# Patient Record
Sex: MALE | Race: WHITE | NOT HISPANIC OR LATINO | Employment: FULL TIME | URBAN - METROPOLITAN AREA
[De-identification: names, ages, dates, MRNs, and addresses within clinical notes are randomized per-mention and may not be internally consistent; named-entity substitution may affect disease eponyms.]

---

## 2018-09-28 ENCOUNTER — HOSPITAL ENCOUNTER (INPATIENT)
Facility: OTHER | Age: 23
LOS: 1 days | Discharge: HOME | DRG: 176 | End: 2018-09-29
Attending: EMERGENCY MEDICINE | Admitting: HOSPITALIST
Payer: COMMERCIAL

## 2018-09-28 DIAGNOSIS — Z82.49 FAMILY HISTORY OF PULMONARY EMBOLISM: ICD-10-CM

## 2018-09-28 DIAGNOSIS — Z71.6 ENCOUNTER FOR SMOKING CESSATION COUNSELING: ICD-10-CM

## 2018-09-28 DIAGNOSIS — I26.99 OTHER ACUTE PULMONARY EMBOLISM WITHOUT ACUTE COR PULMONALE: ICD-10-CM

## 2018-09-28 DIAGNOSIS — I26.99 PULMONARY INFARCT: Primary | ICD-10-CM

## 2018-09-28 DIAGNOSIS — R07.9 RIGHT-SIDED CHEST PAIN: ICD-10-CM

## 2018-09-28 DIAGNOSIS — R00.1 BRADYCARDIA: ICD-10-CM

## 2018-09-28 DIAGNOSIS — I26.99 PULMONARY EMBOLISM: ICD-10-CM

## 2018-09-28 DIAGNOSIS — I26.99 PULMONARY THROMBOEMBOLISM: ICD-10-CM

## 2018-09-28 LAB
ANION GAP SERPL CALC-SCNC: 9 MMOL/L
BASOPHILS # BLD AUTO: 0.02 K/UL
BASOPHILS NFR BLD: 0.2 %
BUN SERPL-MCNC: 15 MG/DL
CALCIUM SERPL-MCNC: 9.5 MG/DL
CHLORIDE SERPL-SCNC: 109 MMOL/L
CO2 SERPL-SCNC: 26 MMOL/L
CREAT SERPL-MCNC: 0.8 MG/DL
D DIMER PPP IA.FEU-MCNC: 1.38 MG/L FEU
DIFFERENTIAL METHOD: ABNORMAL
EOSINOPHIL # BLD AUTO: 0.2 K/UL
EOSINOPHIL NFR BLD: 1.5 %
ERYTHROCYTE [DISTWIDTH] IN BLOOD BY AUTOMATED COUNT: 12.4 %
EST. GFR  (AFRICAN AMERICAN): >60 ML/MIN/1.73 M^2
EST. GFR  (NON AFRICAN AMERICAN): >60 ML/MIN/1.73 M^2
GLUCOSE SERPL-MCNC: 94 MG/DL
HCT VFR BLD AUTO: 41.8 %
HGB BLD-MCNC: 14.3 G/DL
LYMPHOCYTES # BLD AUTO: 1.4 K/UL
LYMPHOCYTES NFR BLD: 13.7 %
MCH RBC QN AUTO: 31.4 PG
MCHC RBC AUTO-ENTMCNC: 34.2 G/DL
MCV RBC AUTO: 92 FL
MONOCYTES # BLD AUTO: 0.9 K/UL
MONOCYTES NFR BLD: 9.1 %
NEUTROPHILS # BLD AUTO: 7.6 K/UL
NEUTROPHILS NFR BLD: 75.2 %
PLATELET # BLD AUTO: 151 K/UL
PMV BLD AUTO: 10.2 FL
POTASSIUM SERPL-SCNC: 3.7 MMOL/L
RBC # BLD AUTO: 4.55 M/UL
SODIUM SERPL-SCNC: 144 MMOL/L
WBC # BLD AUTO: 10.13 K/UL

## 2018-09-28 PROCEDURE — 63600175 PHARM REV CODE 636 W HCPCS: Performed by: EMERGENCY MEDICINE

## 2018-09-28 PROCEDURE — 85025 COMPLETE CBC W/AUTO DIFF WBC: CPT

## 2018-09-28 PROCEDURE — 80048 BASIC METABOLIC PNL TOTAL CA: CPT

## 2018-09-28 PROCEDURE — 99285 EMERGENCY DEPT VISIT HI MDM: CPT | Mod: 25

## 2018-09-28 PROCEDURE — 96374 THER/PROPH/DIAG INJ IV PUSH: CPT

## 2018-09-28 PROCEDURE — 93010 ELECTROCARDIOGRAM REPORT: CPT | Mod: ,,, | Performed by: INTERNAL MEDICINE

## 2018-09-28 PROCEDURE — 99220 PR INITIAL OBSERVATION CARE,LEVL III: CPT | Mod: ,,, | Performed by: NURSE PRACTITIONER

## 2018-09-28 PROCEDURE — 94761 N-INVAS EAR/PLS OXIMETRY MLT: CPT

## 2018-09-28 PROCEDURE — 93005 ELECTROCARDIOGRAM TRACING: CPT

## 2018-09-28 PROCEDURE — 25000003 PHARM REV CODE 250: Performed by: NURSE PRACTITIONER

## 2018-09-28 PROCEDURE — 25000003 PHARM REV CODE 250: Performed by: EMERGENCY MEDICINE

## 2018-09-28 PROCEDURE — 85379 FIBRIN DEGRADATION QUANT: CPT

## 2018-09-28 PROCEDURE — 96372 THER/PROPH/DIAG INJ SC/IM: CPT | Mod: 59

## 2018-09-28 PROCEDURE — 25500020 PHARM REV CODE 255: Performed by: EMERGENCY MEDICINE

## 2018-09-28 PROCEDURE — 11000001 HC ACUTE MED/SURG PRIVATE ROOM

## 2018-09-28 RX ORDER — KETOROLAC TROMETHAMINE 30 MG/ML
15 INJECTION, SOLUTION INTRAMUSCULAR; INTRAVENOUS
Status: COMPLETED | OUTPATIENT
Start: 2018-09-28 | End: 2018-09-28

## 2018-09-28 RX ORDER — SODIUM CHLORIDE 0.9 % (FLUSH) 0.9 %
3 SYRINGE (ML) INJECTION
Status: DISCONTINUED | OUTPATIENT
Start: 2018-09-28 | End: 2018-09-29 | Stop reason: HOSPADM

## 2018-09-28 RX ORDER — ACETAMINOPHEN 325 MG/1
650 TABLET ORAL EVERY 8 HOURS PRN
Status: DISCONTINUED | OUTPATIENT
Start: 2018-09-28 | End: 2018-09-29 | Stop reason: HOSPADM

## 2018-09-28 RX ORDER — ORPHENADRINE CITRATE 30 MG/ML
60 INJECTION INTRAMUSCULAR; INTRAVENOUS
Status: COMPLETED | OUTPATIENT
Start: 2018-09-28 | End: 2018-09-28

## 2018-09-28 RX ORDER — ENOXAPARIN SODIUM 100 MG/ML
1 INJECTION SUBCUTANEOUS
Status: COMPLETED | OUTPATIENT
Start: 2018-09-28 | End: 2018-09-28

## 2018-09-28 RX ORDER — ENOXAPARIN SODIUM 100 MG/ML
1 INJECTION SUBCUTANEOUS
Status: DISCONTINUED | OUTPATIENT
Start: 2018-09-28 | End: 2018-09-29 | Stop reason: HOSPADM

## 2018-09-28 RX ORDER — ALPRAZOLAM 0.25 MG/1
0.25 TABLET ORAL 2 TIMES DAILY PRN
Status: DISCONTINUED | OUTPATIENT
Start: 2018-09-28 | End: 2018-09-29 | Stop reason: HOSPADM

## 2018-09-28 RX ORDER — MULTIVITAMIN
1 TABLET ORAL DAILY
Status: ON HOLD | COMMUNITY
End: 2018-10-02 | Stop reason: CLARIF

## 2018-09-28 RX ADMIN — ENOXAPARIN SODIUM 70 MG: 100 INJECTION SUBCUTANEOUS at 07:09

## 2018-09-28 RX ADMIN — KETOROLAC TROMETHAMINE 15 MG: 30 INJECTION, SOLUTION INTRAMUSCULAR at 06:09

## 2018-09-28 RX ADMIN — ENOXAPARIN SODIUM 70 MG: 100 INJECTION SUBCUTANEOUS at 08:09

## 2018-09-28 RX ADMIN — ORPHENADRINE CITRATE 60 MG: 30 INJECTION INTRAMUSCULAR; INTRAVENOUS at 06:09

## 2018-09-28 RX ADMIN — ALPRAZOLAM 0.25 MG: 0.25 TABLET ORAL at 07:09

## 2018-09-28 RX ADMIN — IOHEXOL 75 ML: 350 INJECTION, SOLUTION INTRAVENOUS at 07:09

## 2018-09-28 RX ADMIN — ACETAMINOPHEN 650 MG: 325 TABLET ORAL at 02:09

## 2018-09-28 NOTE — NURSING TRANSFER
Nursing Transfer Note      9/28/2018     Transfer From: From East to room 303    Transfer via bed patient bed    Transfer with cardiac monitoring belongings    Transported by transport      Notified: Mother    Patient reassessed at:  9/28/2018 17:30    Upon arrival to floor: Patient awake, alert and oriented x 3.  Respirations even and unlabored. No complaints voiced at this time, mother at bedside.

## 2018-09-28 NOTE — ASSESSMENT & PLAN NOTE
Awaiting information from patient's mother; will follow up with HemOnc on discharge.  Will need to establish PCP at [facility] on discharge.

## 2018-09-28 NOTE — H&P
"[facility] Hospital Medicine  History & Physical    Patient Name: Alcides Schaefer  MRN: [id number]  Admission Date: 9/28/2018  Attending Physician: Sherron Clifford MD   Primary Care Provider: No primary care provider on file.         Patient information was obtained from patient and ER records.     Subjective:     Principal Problem:Pulmonary thromboembolism    Chief Complaint:   Chief Complaint   Patient presents with    Shoulder Pain     woke up with right shoulder pain, thought he slept on it wrong, but conitnued to hurt, getting worse; now in right lateral chest        HPI: Mr. Alcides Schaefer is a 23 year old male without pertinent past medical history presented to the Emergency Department with complaint of right shoulder pain and right chest wall pain that began at approximately 0530 this morning. The patient states he awoke this morning and initially noted pain to his right shoulder.  He states the pain radiated to right sided chest wall, but assumed it was because he slept on his side the "wrong way."  He states after taking his morning shower he developed a sudden episode of shortness of breath and pain on deep inspiration.  He states he was concerned because his sister had a pulmonary embolism and recalled her symptoms were the same.   He denies any recent trauma to the area or heavy lifting.  He reports coughing over the last few days which has resolved and he denies any hemoptysis  The patient reports that he moved to [place] from [place] approximately three weeks ago.  Two weeks ago, he reports air travel from [place] to [place] and during trip he recalls pain to bilateral calves, but assumed it was due to long periods of walking.  One week ago, he also reports a seven hour car ride from his home in [place] to [place] after returning from [place].  He currently denies leg pain. His risk factors include smoking and  family history of pulmonary emboli, his sister at age " "16 and maternal aunt.  He states his sister did have testing, but the tests were "inconclusive."  CTA of chest in the ED Bilateral pulmonary thromboemboli involving segmental and subsegmental branches of the bilateral upper and lower lobes  concerning for developing right lower lobe pulmonary infarction. The patient was started in lovenox 1m/kg in the ED.  He will be admitted under observation for close monitoring and continuation of anticoagulation.            History reviewed. No pertinent past medical history.    Past Surgical History:   Procedure Laterality Date    NO PAST SURGERIES         Review of patient's allergies indicates:  No Known Allergies    No current facility-administered medications on file prior to encounter.      No current outpatient medications on file prior to encounter.     Family History     Problem Relation (Age of Onset)    Pulmonary embolism Sister, Paternal Aunt        Tobacco Use    Smoking status: Current Some Day Smoker     Types: Cigarettes    Smokeless tobacco: Never Used    Tobacco comment: 2-3 cigarettes daily    Substance and Sexual Activity    Alcohol use: Yes    Drug use: Yes     Frequency: 3.0 times per week     Types: Marijuana    Sexual activity: Not on file     Review of Systems   Constitutional: Negative for chills and fever.   HENT: Negative for congestion and trouble swallowing.    Eyes: Negative for visual disturbance.   Respiratory: Positive for chest tightness. Negative for shortness of breath.    Cardiovascular: Negative for chest pain, palpitations and leg swelling.   Gastrointestinal: Negative for abdominal pain, diarrhea, nausea and vomiting.   Genitourinary: Negative for dysuria and hematuria.   Musculoskeletal: Negative.    Skin: Negative.    Neurological: Negative for seizures and weakness.   Hematological: Does not bruise/bleed easily.   Psychiatric/Behavioral: Negative.      Objective:     Vital Signs (Most Recent):  Temp: 97.9 °F (36.6 °C) (09/28/18 " 0943)  Pulse: (!) 48 (09/28/18 0943)  Resp: 18 (09/28/18 0943)  BP: 108/61 (09/28/18 0943)  SpO2: 100 % (09/28/18 0943) Vital Signs (24h Range):  Temp:  [97.8 °F (36.6 °C)-97.9 °F (36.6 °C)] 97.9 °F (36.6 °C)  Pulse:  [44-52] 48  Resp:  [14-21] 18  SpO2:  [96 %-100 %] 100 %  BP: (108-123)/(55-74) 108/61     Weight: 65.8 kg (145 lb 0 oz)  Body mass index is 21.41 kg/m².    Physical Exam   Constitutional: He is oriented to person, place, and time. He appears well-developed and well-nourished. No distress.   HENT:   Head: Normocephalic and atraumatic.   Eyes: Conjunctivae and lids are normal. Pupils are equal, round, and reactive to light.   Neck: Normal range of motion. Neck supple.   Cardiovascular: Regular rhythm, normal heart sounds, intact distal pulses and normal pulses. Bradycardia present.   Pulmonary/Chest: Effort normal and breath sounds normal. No respiratory distress.   Abdominal: Soft. Normal appearance and bowel sounds are normal. There is no tenderness.   Musculoskeletal: He exhibits no edema or tenderness.   Lymphadenopathy:     He has no cervical adenopathy.   Neurological: He is alert and oriented to person, place, and time. He has normal strength.   Skin: Skin is warm, dry and intact.   Psychiatric: He has a normal mood and affect. His behavior is normal. Cognition and memory are normal.   Nursing note and vitals reviewed.        CRANIAL NERVES     CN III, IV, VI   Pupils are equal, round, and reactive to light.       Significant Labs:   CBC:   Recent Labs   Lab  09/28/18   0639   WBC  10.13   HGB  14.3   HCT  41.8   PLT  151     CMP:   Recent Labs   Lab  09/28/18   0639   NA  144   K  3.7   CL  109   CO2  26   GLU  94   BUN  15   CREATININE  0.8   CALCIUM  9.5   ANIONGAP  9   EGFRNONAA  >60       D Dimer 9/28/2018  1.38    All pertinent labs within the past 24 hours have been reviewed.    Significant Imaging: I have reviewed all pertinent imaging results/findings within the past 24 hours.     CTA  Chest Non-contrast 9/28/2018  Impression     Bilateral pulmonary thromboemboli involving segmental and subsegmental branches of the bilateral upper and lower lobes with findings concerning for developing right lower lobe pulmonary infarction.  No evidence of right heart strain.   Electronically signed by: Jocy Martinez MD  Date: 09/28/2018  Time: 08:03         Assessment/Plan:     * Pulmonary thromboembolism    D Dimer 1.38 and CTA Chest 9/28/2018: Bilateral pulmonary thromboemboli involving segmental and subsegmental branches of the bilateral upper and lower lobes with findings concerning for developing right lower lobe pulmonary infarction.  No evidence of right heart strain.    Place on telemetry  Will monitor for hypotension.  Continue lovenox 1mg/kg (70 mg) twice daily for now          Right-sided chest pain    Offer acetaminophen for pain          Family history of pulmonary embolism    Awaiting information from patient's mother; will follow up with HemOnc on discharge.  Will need to establish PCP at [facility] on discharge.          Encounter for smoking cessation counseling    States is willing to quit.  Will continue to provide support and education for smoking cessation.          Bradycardia    Asymptomatic; patient is a long time runner.  Will monitor.            VTE Risk Mitigation (From admission, onward)        Ordered     enoxaparin injection 70 mg  Every 12 hours (non-standard times)      09/28/18 0955     IP VTE HIGH RISK PATIENT  Once      09/28/18 0955             Judi Hollis NP  Department of Hospital Medicine   [facility]

## 2018-09-28 NOTE — ASSESSMENT & PLAN NOTE
States is willing to quit.  Will continue to provide support and education for smoking cessation.

## 2018-09-28 NOTE — PHARMACY MED REC
"Admission Medication Reconciliation - Pharmacy Consult Note    The home medication history was taken by Anitha Terry CPhT.  Based on information gathered and subsequent review by the clinical pharmacist, the items below may need attention.    You may go to "Admission" then "Reconcile Home Medications" tabs to review and/or act upon these items.    No issues noted with the medication reconciliation.    Medications Prior to Admission   Medication Sig Dispense Refill Last Dose    DM/pseudoephed/acetaminophen (VICKS DAYQUIL ORAL) Pt took a capful (5-10 ml) on yesterday 9/27/18       multivitamin (THERAGRAN) per tablet Take 1 tablet by mouth once daily.          Please address this information as you see fit.  Feel free to contact us if you have any questions or require assistance.    Emre Birmingham, Pharm.D., BCPS  [phone]                .  .          "

## 2018-09-28 NOTE — ED TRIAGE NOTES
Pt presents with right shoulder pain, onset this AM. Pt woke up with pain. Pt points to right trapezius muscle to locate pain. Pt denies any recent heavy lifting or trauma to the area. Pt states some recent travel on plane and long car rides. Pt denies any leg swelling. Pt denies taking any medication for symptoms PTA. Pt is well appearing, pt in NAD.

## 2018-09-28 NOTE — ED PROVIDER NOTES
Encounter Date: 9/28/2018       History     Chief Complaint   Patient presents with    Shoulder Pain     woke up with right shoulder pain, thought he slept on it wrong, but conitnued to hurt, getting worse; now in right lateral chest     23-year-old healthy male presents complaining of approximately an hour and 45 min of right shoulder pain that has now progressed to right-sided chest wall pain down to the edge of the rib.  The pain is now aggravated by deep inspiration.  Patient reports waking up with this pain.  He denies any interventions at home prior to his ED visit.  He denies any similar symptoms in the past.  He denies any recent trauma to the area or heavy lifting.  He reports coughing over the last few days which has resolved.  He denies any hemoptysis or leg swelling. He does report recent travel which includes a 7 hr car ride from [place] approximately 1 week ago.  He also reports a family history of pulmonary emboli which is unclear if they have been provoked.  He does admit to smoking.          Review of patient's allergies indicates:  No Known Allergies  History reviewed. No pertinent past medical history.  Past Surgical History:   Procedure Laterality Date    NO PAST SURGERIES       History reviewed. No pertinent family history.  Social History     Tobacco Use    Smoking status: Current Every Day Smoker    Smokeless tobacco: Never Used    Tobacco comment: 2-3 cigarettes daily    Substance Use Topics    Alcohol use: Yes    Drug use: Yes     Frequency: 3.0 times per week     Types: Marijuana     Review of Systems   Constitutional: Negative for chills, diaphoresis, fatigue and fever.   Respiratory: Negative for cough, chest tightness, wheezing and stridor.    Cardiovascular: Positive for chest pain. Negative for palpitations and leg swelling.   Gastrointestinal: Negative for abdominal pain, diarrhea, nausea and vomiting.   Genitourinary: Negative for dysuria, flank pain, frequency and  hematuria.   Musculoskeletal: Negative for arthralgias, myalgias and neck pain.   Skin: Negative for rash.   Neurological: Negative for dizziness, weakness, light-headedness and headaches.       Physical Exam     Initial Vitals [09/28/18 0609]   BP Pulse Resp Temp SpO2   123/74 (!) 51 18 97.8 °F (36.6 °C) 99 %      MAP       --         Physical Exam    Nursing note and vitals reviewed.  Constitutional: He appears well-developed and well-nourished. He is not diaphoretic. No distress.   HENT:   Head: Normocephalic and atraumatic.   Right Ear: External ear normal.   Left Ear: External ear normal.   Nose: Nose normal.   Eyes: Conjunctivae and EOM are normal. Right eye exhibits no discharge. Left eye exhibits no discharge. No scleral icterus.   Neck: Normal range of motion. Neck supple. No tracheal deviation present. No JVD present.   Cardiovascular: Normal rate, regular rhythm and normal heart sounds. Exam reveals no friction rub.    No murmur heard.  Pulmonary/Chest: Breath sounds normal. No stridor. No respiratory distress. He has no wheezes. He has no rhonchi. He has no rales.   Right-sided anterior chest wall tenderness to palpation with no crepitus   Musculoskeletal: Normal range of motion. He exhibits no edema or tenderness.   Pain is not reproducible with arm movement against resistance, no calf tenderness, no leg edema   Neurological: He is alert and oriented to person, place, and time. He has normal strength.   Skin: Skin is warm and dry. Capillary refill takes less than 2 seconds. No rash noted. No erythema.   No rash or erythema present over chest wall   Psychiatric: He has a normal mood and affect. His behavior is normal. Judgment and thought content normal.         ED Course   Procedures  Labs Reviewed   CBC W/ AUTO DIFFERENTIAL   BASIC METABOLIC PANEL   D DIMER, QUANTITATIVE     EKG Readings: (Independently Interpreted)   Initial Reading: No STEMI. Rhythm: Sinus Bradycardia. Heart Rate: 49. Ectopy: No  Ectopy. Conduction: Normal.       Imaging Results          X-Ray Chest PA And Lateral (Final result)  Result time 09/28/18 06:29:01    Final result by Jocy Martinez MD (09/28/18 06:29:01)                 Impression:      No radiographic evidence of acute intra-thoracic process.      Electronically signed by: Jocy Martinez MD  Date:    09/28/2018  Time:    06:29             Narrative:    EXAMINATION:  XR CHEST PA AND LATERAL    CLINICAL HISTORY:  Chest pain, unspecified.    TECHNIQUE:  PA and lateral views of the chest were performed.    COMPARISON:  None    FINDINGS:  The cardiomediastinal silhouette is within normal limits. The visualized airway is unremarkable.  The lungs appear symmetrically aerated without definite focal alveolar consolidation. No large pleural effusion or pneumothorax is appreciated.Visualized osseous structures are unremarkable.                                    Additional MDM:   Comments: 23-year-old male presents complaining of right-sided shoulder/chest pain which is aggravated by inspiration.  Patient is in no respiratory distress but appears uncomfortable.  Initial vital signs within normal limits. Exam significant for right-sided chest wall tenderness to palpation. Concern for a pneumothorax versus pulmonary embolus versus musculoskeletal etiology.  Will obtain a chest x-ray and if negative will proceed with lab work including a D-dimer.     6:32 AM  EKG is consistent sinus bradycardia but otherwise unremarkable.  Chest x-ray shows no evidence of a pneumothorax.  Will obtain labs including CBC, BMP, and D-dimer as well as given Toradol and Norflex.      6:50 AM  Patient is still awaiting medications.  Labs are pending.  His care will be transferred to the 7:00 a.m. physician for reassessment and final disposition..                    Clinical Impression:     1. Right-sided chest pain    2. Right-sided chest pain                                 Anna Youngblood MD  09/28/18 0662

## 2018-09-28 NOTE — ASSESSMENT & PLAN NOTE
D Dimer 1.38 and CTA Chest 9/28/2018: Bilateral pulmonary thromboemboli involving segmental and subsegmental branches of the bilateral upper and lower lobes with findings concerning for developing right lower lobe pulmonary infarction.  No evidence of right heart strain.    Place on telemetry  Will monitor for hypotension.  Continue lovenox 1mg/kg (70 mg) twice daily for now

## 2018-09-28 NOTE — ED NOTES
NEURO: Pt AAO x 4. Behavior and speech appropriate to situation.   CARDIAC: Regular rhythm auscultated  RESPIRATORY: Respirations even and unlabored. Breath sounds clear and equal to all lung fields.   MUSCULOSKELETAL: Active ROM noted to extremities. Tenderness to palpation to right trapezius, right superior lateral chest wall

## 2018-09-28 NOTE — SUBJECTIVE & OBJECTIVE
History reviewed. No pertinent past medical history.    Past Surgical History:   Procedure Laterality Date    NO PAST SURGERIES         Review of patient's allergies indicates:  No Known Allergies    No current facility-administered medications on file prior to encounter.      No current outpatient medications on file prior to encounter.     Family History     Problem Relation (Age of Onset)    Pulmonary embolism Sister, Paternal Aunt        Tobacco Use    Smoking status: Current Some Day Smoker     Types: Cigarettes    Smokeless tobacco: Never Used    Tobacco comment: 2-3 cigarettes daily    Substance and Sexual Activity    Alcohol use: Yes    Drug use: Yes     Frequency: 3.0 times per week     Types: Marijuana    Sexual activity: Not on file     Review of Systems   Constitutional: Negative for chills and fever.   HENT: Negative for congestion and trouble swallowing.    Eyes: Negative for visual disturbance.   Respiratory: Positive for chest tightness. Negative for shortness of breath.    Cardiovascular: Negative for chest pain, palpitations and leg swelling.   Gastrointestinal: Negative for abdominal pain, diarrhea, nausea and vomiting.   Genitourinary: Negative for dysuria and hematuria.   Musculoskeletal: Negative.    Skin: Negative.    Neurological: Negative for seizures and weakness.   Hematological: Does not bruise/bleed easily.   Psychiatric/Behavioral: Negative.      Objective:     Vital Signs (Most Recent):  Temp: 97.9 °F (36.6 °C) (09/28/18 0943)  Pulse: (!) 48 (09/28/18 0943)  Resp: 18 (09/28/18 0943)  BP: 108/61 (09/28/18 0943)  SpO2: 100 % (09/28/18 0943) Vital Signs (24h Range):  Temp:  [97.8 °F (36.6 °C)-97.9 °F (36.6 °C)] 97.9 °F (36.6 °C)  Pulse:  [44-52] 48  Resp:  [14-21] 18  SpO2:  [96 %-100 %] 100 %  BP: (108-123)/(55-74) 108/61     Weight: 65.8 kg (145 lb 0 oz)  Body mass index is 21.41 kg/m².    Physical Exam   Constitutional: He is oriented to person, place, and time. He appears  well-developed and well-nourished. No distress.   HENT:   Head: Normocephalic and atraumatic.   Eyes: Conjunctivae and lids are normal. Pupils are equal, round, and reactive to light.   Neck: Normal range of motion. Neck supple.   Cardiovascular: Regular rhythm, normal heart sounds, intact distal pulses and normal pulses. Bradycardia present.   Pulmonary/Chest: Effort normal and breath sounds normal. No respiratory distress.   Abdominal: Soft. Normal appearance and bowel sounds are normal. There is no tenderness.   Musculoskeletal: He exhibits no edema or tenderness.   Lymphadenopathy:     He has no cervical adenopathy.   Neurological: He is alert and oriented to person, place, and time. He has normal strength.   Skin: Skin is warm, dry and intact.   Psychiatric: He has a normal mood and affect. His behavior is normal. Cognition and memory are normal.   Nursing note and vitals reviewed.        CRANIAL NERVES     CN III, IV, VI   Pupils are equal, round, and reactive to light.       Significant Labs:   CBC:   Recent Labs   Lab  09/28/18   0639   WBC  10.13   HGB  14.3   HCT  41.8   PLT  151     CMP:   Recent Labs   Lab  09/28/18   0639   NA  144   K  3.7   CL  109   CO2  26   GLU  94   BUN  15   CREATININE  0.8   CALCIUM  9.5   ANIONGAP  9   EGFRNONAA  >60       D Dimer 9/28/2018  1.38    All pertinent labs within the past 24 hours have been reviewed.    Significant Imaging: I have reviewed all pertinent imaging results/findings within the past 24 hours.     CTA Chest Non-contrast 9/28/2018  Impression     Bilateral pulmonary thromboemboli involving segmental and subsegmental branches of the bilateral upper and lower lobes with findings concerning for developing right lower lobe pulmonary infarction.  No evidence of right heart strain.   Electronically signed by: Jocy Martinez MD  Date: 09/28/2018  Time: 08:03

## 2018-09-28 NOTE — HPI
"Mr. Alcides Schaefer is a 23 year old male without pertinent past medical history presented to the Emergency Department with complaint of right shoulder pain and right chest wall pain that began at approximately 0530 this morning. The patient states he awoke this morning and initially noted pain to his right shoulder.  He states the pain radiated to right sided chest wall, but assumed it was because he slept on his side the "wrong way."  He states after taking his morning shower he developed a sudden episode of shortness of breath and pain on deep inspiration.  He states he was concerned because his sister had a pulmonary embolism and recalled her symptoms were the same.   He denies any recent trauma to the area or heavy lifting.  He reports coughing over the last few days which has resolved and he denies any hemoptysis  The patient reports that he moved to [place] from [place] approximately three weeks ago.  Two weeks ago, he reports air travel from [place] to [place] and during trip he recalls pain to bilateral calves, but assumed it was due to long periods of walking.  One week ago, he also reports a seven hour car ride from his home in [place] to [place] after returning from [place].  He currently denies leg pain. His risk factors include smoking and  family history of pulmonary emboli, his sister at age 16 and maternal aunt.  He states his sister did have testing, but the tests were "inconclusive."  CTA of chest in the ED Bilateral pulmonary thromboemboli involving segmental and subsegmental branches of the bilateral upper and lower lobes  concerning for developing right lower lobe pulmonary infarction. The patient was started in lovenox 1m/kg in the ED.  He will be admitted under observation for close monitoring and continuation of anticoagulation.          "

## 2018-09-29 VITALS
BODY MASS INDEX: 21.48 KG/M2 | OXYGEN SATURATION: 98 % | DIASTOLIC BLOOD PRESSURE: 55 MMHG | TEMPERATURE: 98 F | SYSTOLIC BLOOD PRESSURE: 106 MMHG | RESPIRATION RATE: 18 BRPM | HEIGHT: 69 IN | HEART RATE: 54 BPM | WEIGHT: 145 LBS

## 2018-09-29 LAB
ANION GAP SERPL CALC-SCNC: 9 MMOL/L
BASOPHILS # BLD AUTO: 0.01 K/UL
BASOPHILS NFR BLD: 0.1 %
BUN SERPL-MCNC: 11 MG/DL
CALCIUM SERPL-MCNC: 9.4 MG/DL
CHLORIDE SERPL-SCNC: 107 MMOL/L
CO2 SERPL-SCNC: 23 MMOL/L
CREAT SERPL-MCNC: 0.7 MG/DL
DIFFERENTIAL METHOD: ABNORMAL
EOSINOPHIL # BLD AUTO: 0.1 K/UL
EOSINOPHIL NFR BLD: 1.5 %
ERYTHROCYTE [DISTWIDTH] IN BLOOD BY AUTOMATED COUNT: 12.5 %
EST. GFR  (AFRICAN AMERICAN): >60 ML/MIN/1.73 M^2
EST. GFR  (NON AFRICAN AMERICAN): >60 ML/MIN/1.73 M^2
GLUCOSE SERPL-MCNC: 90 MG/DL
HCT VFR BLD AUTO: 38.6 %
HGB BLD-MCNC: 13.1 G/DL
LYMPHOCYTES # BLD AUTO: 1.9 K/UL
LYMPHOCYTES NFR BLD: 19.8 %
MCH RBC QN AUTO: 31 PG
MCHC RBC AUTO-ENTMCNC: 33.9 G/DL
MCV RBC AUTO: 92 FL
MONOCYTES # BLD AUTO: 1.2 K/UL
MONOCYTES NFR BLD: 12.6 %
NEUTROPHILS # BLD AUTO: 6.3 K/UL
NEUTROPHILS NFR BLD: 65.8 %
PLATELET # BLD AUTO: 137 K/UL
PMV BLD AUTO: 10.1 FL
POTASSIUM SERPL-SCNC: 3.8 MMOL/L
RBC # BLD AUTO: 4.22 M/UL
SODIUM SERPL-SCNC: 139 MMOL/L
WBC # BLD AUTO: 9.63 K/UL

## 2018-09-29 PROCEDURE — 85025 COMPLETE CBC W/AUTO DIFF WBC: CPT

## 2018-09-29 PROCEDURE — 36415 COLL VENOUS BLD VENIPUNCTURE: CPT

## 2018-09-29 PROCEDURE — 63600175 PHARM REV CODE 636 W HCPCS: Performed by: EMERGENCY MEDICINE

## 2018-09-29 PROCEDURE — 99239 HOSP IP/OBS DSCHRG MGMT >30: CPT | Mod: ,,, | Performed by: NURSE PRACTITIONER

## 2018-09-29 PROCEDURE — 80048 BASIC METABOLIC PNL TOTAL CA: CPT

## 2018-09-29 PROCEDURE — 94761 N-INVAS EAR/PLS OXIMETRY MLT: CPT

## 2018-09-29 RX ORDER — HYDROCODONE BITARTRATE AND ACETAMINOPHEN 5; 325 MG/1; MG/1
1 TABLET ORAL EVERY 8 HOURS PRN
Qty: 12 TABLET | Refills: 0 | Status: SHIPPED | OUTPATIENT
Start: 2018-09-29

## 2018-09-29 RX ADMIN — ENOXAPARIN SODIUM 70 MG: 100 INJECTION SUBCUTANEOUS at 09:09

## 2018-09-29 NOTE — PLAN OF CARE
SW met with pt at bedside to complete discharge assessment, no PCP and uses [facility] on [address] and [address].  Pt lives with 2 friends; mother present and will provide transportation home.  No need identified at this time.     09/29/18 1103   Discharge Assessment   Assessment Type Discharge Planning Assessment   Confirmed/corrected address and phone number on facesheet? Yes   Assessment information obtained from? Patient   Communicated expected length of stay with patient/caregiver no   Prior to hospitilization cognitive status: Alert/Oriented   Prior to hospitalization functional status: Independent   Current cognitive status: Alert/Oriented   Current Functional Status: Independent   Lives With friend(s)   Able to Return to Prior Arrangements yes   Is patient able to care for self after discharge? Yes;Unable to determine at this time (comments)   Patient's perception of discharge disposition home or selfcare   Readmission Within The Last 30 Days no previous admission in last 30 days   Patient currently being followed by outpatient case management? No   Patient currently receives any other outside agency services? No   Equipment Currently Used at Home none   Do you have any problems affording any of your prescribed medications? No   Is the patient taking medications as prescribed? yes   Does the patient have transportation home? Yes   Transportation Available family or friend will provide   Does the patient receive services at the [facility]? No   Discharge Plan A Home   Patient/Family In Agreement With Plan yes

## 2018-09-29 NOTE — NURSING
Patient educated on discharge instructions and verbalized understanding.  All questions answered to patient's satisfaction.  IV removed without complication.  Mother at bedside.  Patient to be transported off unit by walking.

## 2018-09-29 NOTE — PLAN OF CARE
Problem: Patient Care Overview  Goal: Plan of Care Review  Outcome: Ongoing (interventions implemented as appropriate)  Vital signs stable, on room air. Pt was anxious upon assessment. BETHANY Perdomo notified. Xanax ordered and relieved symptoms. Pt can ambulate and reposition self independently. On tele monitor, running sinus kasie. Bed locked, in lowest position. Call light, personal items, and family at bedside. Will continue to monitor.

## 2018-09-29 NOTE — PLAN OF CARE
09/29/18 1338   Final Note   Assessment Type Final Discharge Note   Discharge Disposition Home   What phone number can be called within the next 1-3 days to see how you are doing after discharge? ([phone])   Hospital Follow Up  Appt(s) scheduled? No   Discharge plans and expectations educations in teach back method with documentation complete? No

## 2018-09-29 NOTE — HOSPITAL COURSE
The patient is a 23 year old male with no significant medical history who was admitted under observation after being found to have pulmonary thromboemboli in bilateral upper and lower lobes with concern for developing right lower lobe infarct. D dimer was elevated 1.38 in the Emergency Department.   He has identified risk factors of pulmonary embolism in his sister and current cigarette/marijuana smoker. He was counseled on smoking cessation and declined nicotine patch during stay.  He is motivated to quit. The patient continued with lovenox 1mg/kg (70 mg) twice daily through observation period.  He continued to report pleuritic chest pain, but denied shortness of breath. He will be discharged with hydrocodone acetaminophen 7.5-325 every eight hours for pain.  Oxygen saturation greater that 97% during stay.  Patient was bradycardic during stay but this may be his baseline heart rate as he was asymptomatic.  He was discharged on rivaroxaban 15 mg twice daily for twenty one days.  He will follow with Hematology Oncology as soon as possible to establish care and for initiation of hypercoagulable workup given family history.   He will also follow up within one month with primary care to establish care and for follow up after hospital discharge.

## 2018-09-29 NOTE — DISCHARGE SUMMARY
"[facility] Hospital Medicine  Discharge Summary      Patient Name: Alcides Schaefer  MRN: [id number]  Admission Date: 9/28/2018  Hospital Length of Stay: 1 days  Discharge Date and Time: 9/29/2018  3:00 PM  Attending Physician: Sherron Clifford MD  Discharging Provider: Judi Hollis NP  Primary Care Provider: Primary Doctor No      HPI:   Mr. Alcides Schaefer is a 23 year old male without pertinent past medical history presented to the Emergency Department with complaint of right shoulder pain and right chest wall pain that began at approximately 0530 this morning. The patient states he awoke this morning and initially noted pain to his right shoulder.  He states the pain radiated to right sided chest wall, but assumed it was because he slept on his side the "wrong way."  He states after taking his morning shower he developed a sudden episode of shortness of breath and pain on deep inspiration.  He states he was concerned because his sister had a pulmonary embolism and recalled her symptoms were the same.   He denies any recent trauma to the area or heavy lifting.  He reports coughing over the last few days which has resolved and he denies any hemoptysis  The patient reports that he moved to [place] from [place] approximately three weeks ago.  Two weeks ago, he reports air travel from [place] to [place] and during trip he recalls pain to bilateral calves, but assumed it was due to long periods of walking.  One week ago, he also reports a seven hour car ride from his home in [place] to [place] after returning from [place].  He currently denies leg pain. His risk factors include smoking and  family history of pulmonary emboli, his sister at age 16 and maternal aunt.  He states his sister did have testing, but the tests were "inconclusive."  CTA of chest in the ED Bilateral pulmonary thromboemboli involving segmental and subsegmental branches of the bilateral upper and lower lobes  " concerning for developing right lower lobe pulmonary infarction. The patient was started in lovenox 1m/kg in the ED.  He will be admitted under observation for close monitoring and continuation of anticoagulation.            * No surgery found *      Hospital Course:   The patient is a 23 year old male with no significant medical history who was admitted under observation after being found to have pulmonary thromboemboli in bilateral upper and lower lobes with concern for developing right lower lobe infarct. D dimer was elevated 1.38 in the Emergency Department.   He has identified risk factors of pulmonary embolism in his sister and current cigarette/marijuana smoker. He was counseled on smoking cessation and declined nicotine patch during stay.  He is motivated to quit. The patient continued with lovenox 1mg/kg (70 mg) twice daily through observation period.  He continued to report pleuritic chest pain, but denied shortness of breath. He will be discharged with hydrocodone acetaminophen 7.5-325 every eight hours for pain.  Oxygen saturation greater that 97% during stay.  Patient was bradycardic during stay but this may be his baseline heart rate as he was asymptomatic.  He was discharged on rivaroxaban 15 mg twice daily for twenty one days.  He will follow with Hematology Oncology as soon as possible to establish care and for initiation of hypercoagulable workup given family history.   He will also follow up within one month with primary care to establish care and for follow up after hospital discharge.          Consults:     No new Assessment & Plan notes have been filed under this hospital service since the last note was generated.  Service: Hospital Medicine    Final Active Diagnoses:    Diagnosis Date Noted POA    PRINCIPAL PROBLEM:  Pulmonary thromboembolism [I26.99] 09/28/2018 Yes    Pulmonary infarct [I26.99] 09/28/2018 Yes    Right-sided chest pain [R07.9] 09/28/2018 Yes    Family history of pulmonary  embolism [Z82.49] 09/28/2018 Not Applicable    Encounter for smoking cessation counseling [Z71.6, Z72.0] 09/28/2018 Not Applicable    Bradycardia [R00.1] 09/28/2018 Yes      Problems Resolved During this Admission:       Discharged Condition: stable    Disposition: Home or Self Care    Follow Up:  Follow-up Information     Ignacia Kim NP In 2 days.    Specialty:  Hematology and Oncology  Why:  Follow up after hospital discharge and for further evaluation after pulmonary embolism  Contact information:  [address]  [facility] [id number] [phone]             Macey Brizuela MD In 1 week.    Specialty:  Internal Medicine  Why:  follow up after hospital discharge  Contact information:  [address]  [address]  [facility] [address] [phone]                 Patient Instructions:      Diet Adult Regular     Notify your health care provider if you experience any of the following:  increased confusion or weakness     Notify your health care provider if you experience any of the following:  persistent dizziness, light-headedness, or visual disturbances     Notify your health care provider if you experience any of the following:  difficulty breathing or increased cough     Notify your health care provider if you experience any of the following:  severe uncontrolled pain     Notify your health care provider if you experience any of the following:  temperature >100.4     Activity as tolerated       Significant Diagnostic Studies: Labs:   CMP   Recent Labs   Lab  09/28/18   0639  09/29/18   0541   NA  144  139   K  3.7  3.8   CL  109  107   CO2  26  23   GLU  94  90   BUN  15  11   CREATININE  0.8  0.7   CALCIUM  9.5  9.4   ANIONGAP  9  9   ESTGFRAFRICA  >60  >60   EGFRNONAA  >60  >60   , CBC   Recent Labs   Lab  09/28/18   0639  09/29/18   0541   WBC  10.13  9.63   HGB  14.3  13.1*   HCT  41.8  38.6*   PLT  151  137*     D Dimer 9/28/2018  1.38     All labs within the past 24 hours have been  reviewed    Pending Diagnostic Studies:     None         Medications:  Reconciled Home Medications:      Medication List      START taking these medications    HYDROcodone-acetaminophen 5-325 mg per tablet  Commonly known as:  NORCO  Take 1 tablet by mouth every 8 (eight) hours as needed for Pain (pleuritic pain).     rivaroxaban 15 mg Tab  Commonly known as:  XARELTO  Take 1 tablet (15 mg total) by mouth 2 (two) times daily with meals. Please take first dose between 6:00 pm and 8:00 pm on Saturday, September 29, 2018. for 21 days        CONTINUE taking these medications    multivitamin per tablet  Commonly known as:  THERAGRAN  Take 1 tablet by mouth once daily.        STOP taking these medications    VICKS DAYQUIL ORAL            Indwelling Lines/Drains at time of discharge:   Lines/Drains/Airways          None          Time spent on the discharge of patient: 30 minutes  Patient was seen and examined on the date of discharge and determined to be suitable for discharge.         Judi Hollis NP  Department of Hospital Medicine  [facility]

## 2018-10-01 ENCOUNTER — INITIAL CONSULT (OUTPATIENT)
Dept: HEMATOLOGY/ONCOLOGY | Facility: CLINIC | Age: 23
End: 2018-10-01
Payer: COMMERCIAL

## 2018-10-01 ENCOUNTER — LAB VISIT (OUTPATIENT)
Dept: LAB | Facility: HOSPITAL | Age: 23
End: 2018-10-01
Attending: INTERNAL MEDICINE
Payer: COMMERCIAL

## 2018-10-01 ENCOUNTER — TELEPHONE (OUTPATIENT)
Dept: HEMATOLOGY/ONCOLOGY | Facility: CLINIC | Age: 23
End: 2018-10-01

## 2018-10-01 VITALS
RESPIRATION RATE: 18 BRPM | HEART RATE: 63 BPM | OXYGEN SATURATION: 100 % | BODY MASS INDEX: 19.51 KG/M2 | TEMPERATURE: 99 F | HEIGHT: 70 IN | WEIGHT: 136.25 LBS | DIASTOLIC BLOOD PRESSURE: 62 MMHG | SYSTOLIC BLOOD PRESSURE: 112 MMHG

## 2018-10-01 DIAGNOSIS — I26.99 PULMONARY INFARCT: ICD-10-CM

## 2018-10-01 DIAGNOSIS — R53.83 OTHER FATIGUE: ICD-10-CM

## 2018-10-01 DIAGNOSIS — Z71.6 ENCOUNTER FOR SMOKING CESSATION COUNSELING: ICD-10-CM

## 2018-10-01 DIAGNOSIS — I26.99 PULMONARY THROMBOEMBOLISM: Primary | ICD-10-CM

## 2018-10-01 DIAGNOSIS — I26.99 PULMONARY THROMBOEMBOLISM: ICD-10-CM

## 2018-10-01 LAB
ALBUMIN SERPL BCP-MCNC: 4.3 G/DL
ALP SERPL-CCNC: 77 U/L
ALT SERPL W/O P-5'-P-CCNC: 10 U/L
ANION GAP SERPL CALC-SCNC: 10 MMOL/L
AST SERPL-CCNC: 18 U/L
BASOPHILS # BLD AUTO: 0.02 K/UL
BASOPHILS NFR BLD: 0.2 %
BILIRUB SERPL-MCNC: 0.6 MG/DL
BUN SERPL-MCNC: 10 MG/DL
CALCIUM SERPL-MCNC: 10.4 MG/DL
CHLORIDE SERPL-SCNC: 104 MMOL/L
CO2 SERPL-SCNC: 28 MMOL/L
CREAT SERPL-MCNC: 0.9 MG/DL
DIASTOLIC DYSFUNCTION: NO
DIFFERENTIAL METHOD: ABNORMAL
EOSINOPHIL # BLD AUTO: 0.1 K/UL
EOSINOPHIL NFR BLD: 0.5 %
ERYTHROCYTE [DISTWIDTH] IN BLOOD BY AUTOMATED COUNT: 11.8 %
EST. GFR  (AFRICAN AMERICAN): >60 ML/MIN/1.73 M^2
EST. GFR  (NON AFRICAN AMERICAN): >60 ML/MIN/1.73 M^2
GLOBAL PERICARDIAL EFFUSION: NORMAL
GLUCOSE SERPL-MCNC: 87 MG/DL
HCT VFR BLD AUTO: 41.7 %
HGB BLD-MCNC: 14.5 G/DL
IMM GRANULOCYTES # BLD AUTO: 0.03 K/UL
IMM GRANULOCYTES NFR BLD AUTO: 0.3 %
LDH SERPL L TO P-CCNC: 137 U/L
LYMPHOCYTES # BLD AUTO: 1.3 K/UL
LYMPHOCYTES NFR BLD: 13.7 %
MCH RBC QN AUTO: 31.3 PG
MCHC RBC AUTO-ENTMCNC: 34.8 G/DL
MCV RBC AUTO: 90 FL
MONOCYTES # BLD AUTO: 1.1 K/UL
MONOCYTES NFR BLD: 11.1 %
NEUTROPHILS # BLD AUTO: 7.3 K/UL
NEUTROPHILS NFR BLD: 74.2 %
NRBC BLD-RTO: 0 /100 WBC
PLATELET # BLD AUTO: 207 K/UL
PMV BLD AUTO: 10.1 FL
POTASSIUM SERPL-SCNC: 4 MMOL/L
PROT SERPL-MCNC: 8.1 G/DL
RBC # BLD AUTO: 4.63 M/UL
RETIRED EF AND QEF - SEE NOTES: 65 (ref 55–65)
SODIUM SERPL-SCNC: 142 MMOL/L
T4 FREE SERPL-MCNC: 0.94 NG/DL
TSH SERPL DL<=0.005 MIU/L-ACNC: 3.2 UIU/ML
WBC # BLD AUTO: 9.78 K/UL

## 2018-10-01 PROCEDURE — 88185 FLOWCYTOMETRY/TC ADD-ON: CPT

## 2018-10-01 PROCEDURE — 86147 CARDIOLIPIN ANTIBODY EA IG: CPT

## 2018-10-01 PROCEDURE — 86146 BETA-2 GLYCOPROTEIN ANTIBODY: CPT | Mod: 59

## 2018-10-01 PROCEDURE — 81241 F5 GENE: CPT

## 2018-10-01 PROCEDURE — 99205 OFFICE O/P NEW HI 60 MIN: CPT | Mod: S$GLB,,, | Performed by: INTERNAL MEDICINE

## 2018-10-01 PROCEDURE — 99999 PR PBB SHADOW E&M-EST. PATIENT-LVL III: CPT | Mod: PBBFAC,,, | Performed by: INTERNAL MEDICINE

## 2018-10-01 PROCEDURE — 88188 FLOWCYTOMETRY/READ 9-15: CPT

## 2018-10-01 PROCEDURE — 81219 CALR GENE COM VARIANTS: CPT

## 2018-10-01 PROCEDURE — 83615 LACTATE (LD) (LDH) ENZYME: CPT

## 2018-10-01 PROCEDURE — 85025 COMPLETE CBC W/AUTO DIFF WBC: CPT

## 2018-10-01 PROCEDURE — 86038 ANTINUCLEAR ANTIBODIES: CPT

## 2018-10-01 PROCEDURE — 81270 JAK2 GENE: CPT

## 2018-10-01 PROCEDURE — 36415 COLL VENOUS BLD VENIPUNCTURE: CPT

## 2018-10-01 PROCEDURE — 84439 ASSAY OF FREE THYROXINE: CPT

## 2018-10-01 PROCEDURE — 80053 COMPREHEN METABOLIC PANEL: CPT

## 2018-10-01 PROCEDURE — 81403 MOPATH PROCEDURE LEVEL 4: CPT

## 2018-10-01 PROCEDURE — 84443 ASSAY THYROID STIM HORMONE: CPT

## 2018-10-01 PROCEDURE — 86356 MONONUCLEAR CELL ANTIGEN: CPT | Mod: 91

## 2018-10-01 PROCEDURE — 88184 FLOWCYTOMETRY/ TC 1 MARKER: CPT | Mod: 91

## 2018-10-01 PROCEDURE — 3008F BODY MASS INDEX DOCD: CPT | Mod: CPTII,S$GLB,, | Performed by: INTERNAL MEDICINE

## 2018-10-01 PROCEDURE — 81240 F2 GENE: CPT

## 2018-10-01 PROCEDURE — 85300 ANTITHROMBIN III ACTIVITY: CPT

## 2018-10-01 RX ORDER — GUAIFENESIN 1200 MG
2 TABLET, EXTENDED RELEASE 12 HR ORAL 2 TIMES DAILY PRN
COMMUNITY

## 2018-10-01 NOTE — Clinical Note
-Cbc, cmp, f/u in 3 months-Ldh, mpn panel, anti thrombin III, factor v leiden, prothrombin gene mutation, tsh free T4, PNH profile, anti cardiolipin antibody, anti-beta2 GP-I antibodies today--US venous doppler

## 2018-10-01 NOTE — LETTER
October 1, 2018      Judi Hollis, NP  [address]  [facility] [address]           [facility] Hematology Oncology  [address]  [facility] [address]  Phone: [phone]          Patient: Alcides Schaefer   MR Number: [id number]   YOB: 1995   Date of Visit: 10/1/2018       Dear Judi Hollis:    Thank you for referring Alcides Schaefer to me for evaluation. Attached you will find relevant portions of my assessment and plan of care.    If you have questions, please do not hesitate to call me. I look forward to following Alcides Schaefer along with you.    Sincerely,    Felton Sullivan MD    Enclosure  CC:  No Recipients    If you would like to receive this communication electronically, please contact [email or web address] or [phone] to request more information on [employer] access.    For providers and/or their staff who would like to refer a patient to [facility], please contact us through our one-stop-shop provider referral line, [facility] , at [phone].    If you feel you have received this communication in error or would no longer like to receive these types of communications, please e-mail [email or web address]

## 2018-10-01 NOTE — TELEPHONE ENCOUNTER
Spoke with patient.  Scheduled with Dr Laurie Jones for this afternoon.  Gave directions.    =========================================  ----- Message from Nian Ricardo sent at 10/1/2018  8:34 AM CDT -----  Contact: Pt Mom  Pt mom called to speak with some one about making appt Pt went to the ER and states that he was admit and was told to make a Appt with Ignacia Kim NP in 2 days   Callback#[phone]  Thank You  PENNY Ricardo

## 2018-10-01 NOTE — PROGRESS NOTES
CC: Pulmonary embolism, initial consult      HPI: ,23, is here for hematology consultation. He was diagnosed with PE on CTA done on 9/28/18. He woke up that morning with a right shoulder pain, which worsened through the day. He denied chest pain, palpitations. He has recent history ( 2 weeks prior to PE diagnosis) of long distance road and air travel ( 14+ hrs on road, 6+ hrs in air).He is a current smoker.   He c/o pain , especially on deep inspiration, in the left side of his chest. No palpitations, hemoptysis, headaches.      Review of Systems   Constitutional: Positive for malaise/fatigue. Negative for chills, fever and weight loss.   HENT: Negative for congestion, ear discharge and nosebleeds.    Eyes: Negative for blurred vision, double vision and photophobia.   Respiratory: Positive for shortness of breath. Negative for cough, hemoptysis and sputum production.    Cardiovascular: Positive for chest pain. Negative for palpitations and orthopnea.   Gastrointestinal: Negative for abdominal pain, diarrhea, heartburn, nausea and vomiting.   Genitourinary: Negative for dysuria, frequency and urgency.   Skin: Negative for rash.   Neurological: Negative for dizziness, tingling, tremors, sensory change, speech change, weakness and headaches.   Psychiatric/Behavioral: Negative for depression.       No past medical history on file.      Past Surgical History:   Procedure Laterality Date    NO PAST SURGERIES           Social History     Socioeconomic History    Marital status: Single     Spouse name: Not on file    Number of children: Not on file    Years of education: Not on file    Highest education level: Not on file   Social Needs    Financial resource strain: Not on file    Food insecurity - worry: Not on file    Food insecurity - inability: Not on file    Transportation needs - medical: Not on file    Transportation needs - non-medical: Not on file   Occupational History    Not on file   Tobacco  Use    Smoking status: Current Some Day Smoker     Types: Cigarettes    Smokeless tobacco: Never Used    Tobacco comment: 2-3 cigarettes daily    Substance and Sexual Activity    Alcohol use: Yes    Drug use: Yes     Frequency: 3.0 times per week     Types: Marijuana    Sexual activity: Not on file   Other Topics Concern    Not on file   Social History Narrative    Not on file           Family History   Problem Relation Age of Onset    Pulmonary embolism Sister     Pulmonary embolism Paternal Aunt          Review of patient's allergies indicates: No Known Allergies      Current Outpatient Medications   Medication Sig    HYDROcodone-acetaminophen (NORCO) 5-325 mg per tablet Take 1 tablet by mouth every 8 (eight) hours as needed for Pain (pleuritic pain).    multivitamin (THERAGRAN) per tablet Take 1 tablet by mouth once daily.    rivaroxaban (XARELTO) 15 mg Tab Take 1 tablet (15 mg total) by mouth 2 (two) times daily with meals. Please take first dose between 6:00 pm and 8:00 pm on Saturday, September 29, 2018. for 21 days     No current facility-administered medications for this visit.          Vitals:    10/01/18 1507   BP: 112/62   Pulse: 63   Resp: 18   Temp: 98.8 °F (37.1 °C)       Physical Exam   Constitutional: He is oriented to person, place, and time. He appears well-developed. No distress.   HENT:   Head: Normocephalic and atraumatic.   Mouth/Throat: No oropharyngeal exudate.   Eyes: Pupils are equal, round, and reactive to light. No scleral icterus.   Neck: Normal range of motion.   Cardiovascular: Normal rate, regular rhythm and normal heart sounds.   No murmur heard.  Pulmonary/Chest: Effort normal and breath sounds normal. No respiratory distress. He has no wheezes. He has no rales.   Abdominal: Soft. Bowel sounds are normal. He exhibits no distension. There is no tenderness. There is no rebound.   Genitourinary: No penile tenderness.   Genitourinary Comments: Both testicles look and feel  normal. No tenderness or mass. No inguinal adenopathy     Musculoskeletal: He exhibits no edema.   Lymphadenopathy:     He has no cervical adenopathy.   Neurological: He is alert and oriented to person, place, and time. No cranial nerve deficit.   Skin: Skin is warm.   Psychiatric: He has a normal mood and affect.       9/28/18 CTA chest      The thoracic aorta maintains normal caliber, contour, and course without significant atherosclerotic calcification within its course.  There is no evidence of aneurysmal dilation or dissection. The heart is not enlarged and there is no evidence of pericardial effusion.The esophagus maintains a normal course and caliber.There is no axillary, mediastinal, or hilar lymph node enlargement.    The trachea is midline and proximal airways are patent. The lungs are symmetrically expanded.  There is a focal wedge-shaped region of ground-glass opacity in the right lower lobe concerning for pulmonary infarction.    The pulmonary arteries demonstrate filling defects within segmental and subsegmental branches supplying the left upper and lower lobes as well as the right upper and lower lobes in keeping with pulmonary thromboemboli.    Visualized structures of the upper abdomen as well as incidentally visualized osseous structures demonstrate no acute abnormalities.      Impression       Bilateral pulmonary thromboemboli involving segmental and subsegmental branches of the bilateral upper and lower lobes with findings concerning for developing right lower lobe pulmonary infarction.  No evidence of right heart strain.       9/28/18 2D ECHO      CONCLUSIONS     1 - Normal left ventricular systolic function (EF 60-65%).     2 - No wall motion abnormalities.     3 - Mild left atrial enlargement.     4 - No evidence of intracardiac shunt.       Component      Latest Ref Rng & Units 9/29/2018 9/28/2018   WBC      3.90 - 12.70 K/uL 9.63    RBC      4.60 - 6.20 M/uL 4.22 (L)    Hemoglobin      14.0  - 18.0 g/dL 13.1 (L)    Hematocrit      40.0 - 54.0 % 38.6 (L)    MCV      82 - 98 fL 92    MCH      27.0 - 31.0 pg 31.0    MCHC      32.0 - 36.0 g/dL 33.9    RDW      11.5 - 14.5 % 12.5    Platelets      150 - 350 K/uL 137 (L)    MPV      9.2 - 12.9 fL 10.1    Gran # (ANC)      1.8 - 7.7 K/uL 6.3    Lymph #      1.0 - 4.8 K/uL 1.9    Mono #      0.3 - 1.0 K/uL 1.2 (H)    Eos #      0.0 - 0.5 K/uL 0.1    Baso #      0.00 - 0.20 K/uL 0.01    Gran%      38.0 - 73.0 % 65.8    Lymph%      18.0 - 48.0 % 19.8    Mono%      4.0 - 15.0 % 12.6    Eosinophil%      0.0 - 8.0 % 1.5    Basophil%      0.0 - 1.9 % 0.1    Differential Method       Automated    Sodium      136 - 145 mmol/L 139    Potassium      3.5 - 5.1 mmol/L 3.8    Chloride      95 - 110 mmol/L 107    CO2      23 - 29 mmol/L 23    Glucose      70 - 110 mg/dL 90    BUN, Bld      6 - 20 mg/dL 11    Creatinine      0.5 - 1.4 mg/dL 0.7    Calcium      8.7 - 10.5 mg/dL 9.4    Anion Gap      8 - 16 mmol/L 9    eGFR if African American      >60 mL/min/1.73 m:2 >60    eGFR if non African American      >60 mL/min/1.73 m:2 >60    D-Dimer      <0.50 mg/L FEU  1.38 (H)     Assessment:      1. PE  2. Thrombocytopenia  2. Mild absolute  monocytosis  4. Current smoker  5. Family history of pulmonary embolism  6. Dyspnea on exertion  7. Pleuritic chest pain       Plan:    1. Etiology is unclear. It appears to be unprovoked, although he has travelled by car and by plane for over 14hrs in the 2 weeks preceding his diagnosis. Denies using any anabolic steroids/ herbal supplements.  His sister was diagnosed with PE at age 18. She was on oral contraception at  that time. He was actively smoking at the time of his PE diagnosis. He has lost about 7 lbs recently, but no other B symptoms.    Her hypercoagulable work up was negative. His paternal grandmother had PE during one of her pregnancies. His mother has history of 1 miscarriage. No h/o early unexplained deaths, early MI, or early  CVAs. He will have hypercoagulable genetic work up, TFT, MPN panel, PNH profile checked, anti-phospholipid antibodies, as well as AT III. Protein C, Protein S and Factor VIII will not be checked as he had recent PE. He will have US venous doppler of LEs. 2D ECHO on 9/28 was negative for any intra cardiac shunt.  He will continue Rivaroxaban for at least 6 months,as it is not entirely clear if this is a provoked PE.     2. Mild, asymptomatic. Will monitor    3. Will follow. Will check MPN panel.    4. He has stopped smoking     6,7. He has pleuritic chest pain. He takes Tylenol. I suggested using Ibuprofen as needed as well.     Distress Screening Results: Psychosocial Distress screening score of Distress Score: 1 noted and reviewed. No intervention indicated.

## 2018-10-02 ENCOUNTER — HOSPITAL ENCOUNTER (OUTPATIENT)
Facility: OTHER | Age: 23
Discharge: HOME | End: 2018-10-03
Attending: EMERGENCY MEDICINE | Admitting: EMERGENCY MEDICINE
Payer: COMMERCIAL

## 2018-10-02 ENCOUNTER — TELEPHONE (OUTPATIENT)
Dept: INTERNAL MEDICINE | Facility: CLINIC | Age: 23
End: 2018-10-02

## 2018-10-02 DIAGNOSIS — I26.99 PULMONARY INFARCTION: ICD-10-CM

## 2018-10-02 DIAGNOSIS — I26.99 PULMONARY INFARCT: Primary | ICD-10-CM

## 2018-10-02 DIAGNOSIS — R07.9 RIGHT-SIDED CHEST PAIN: ICD-10-CM

## 2018-10-02 DIAGNOSIS — I26.99 BILATERAL PULMONARY EMBOLISM: ICD-10-CM

## 2018-10-02 DIAGNOSIS — R07.9 CHEST PAIN, UNSPECIFIED TYPE: ICD-10-CM

## 2018-10-02 DIAGNOSIS — R07.9 CHEST PAIN: ICD-10-CM

## 2018-10-02 LAB
ANA SER QL IF: NORMAL
ANION GAP SERPL CALC-SCNC: 13 MMOL/L
BASOPHILS # BLD AUTO: 0.01 K/UL
BASOPHILS NFR BLD: 0.1 %
BUN SERPL-MCNC: 11 MG/DL
CALCIUM SERPL-MCNC: 10.1 MG/DL
CARDIOLIPIN IGG SER IA-ACNC: <9.4 GPL
CARDIOLIPIN IGM SER IA-ACNC: <9.4 MPL
CHLORIDE SERPL-SCNC: 103 MMOL/L
CO2 SERPL-SCNC: 26 MMOL/L
CREAT SERPL-MCNC: 0.8 MG/DL
DIFFERENTIAL METHOD: ABNORMAL
EOSINOPHIL # BLD AUTO: 0.1 K/UL
EOSINOPHIL NFR BLD: 1.1 %
ERYTHROCYTE [DISTWIDTH] IN BLOOD BY AUTOMATED COUNT: 12 %
EST. GFR  (AFRICAN AMERICAN): >60 ML/MIN/1.73 M^2
EST. GFR  (NON AFRICAN AMERICAN): >60 ML/MIN/1.73 M^2
GLUCOSE SERPL-MCNC: 102 MG/DL
HCT VFR BLD AUTO: 39.6 %
HGB BLD-MCNC: 13.7 G/DL
LYMPHOCYTES # BLD AUTO: 2.1 K/UL
LYMPHOCYTES NFR BLD: 22.9 %
MCH RBC QN AUTO: 31.2 PG
MCHC RBC AUTO-ENTMCNC: 34.6 G/DL
MCV RBC AUTO: 90 FL
MONOCYTES # BLD AUTO: 1.1 K/UL
MONOCYTES NFR BLD: 11.8 %
NEUTROPHILS # BLD AUTO: 5.8 K/UL
NEUTROPHILS NFR BLD: 64 %
PLATELET # BLD AUTO: 197 K/UL
PMV BLD AUTO: 10.1 FL
POTASSIUM SERPL-SCNC: 3.9 MMOL/L
RBC # BLD AUTO: 4.39 M/UL
SODIUM SERPL-SCNC: 142 MMOL/L
TROPONIN I SERPL DL<=0.01 NG/ML-MCNC: <0.006 NG/ML
WBC # BLD AUTO: 9.01 K/UL

## 2018-10-02 PROCEDURE — 93010 ELECTROCARDIOGRAM REPORT: CPT | Mod: ,,, | Performed by: INTERNAL MEDICINE

## 2018-10-02 PROCEDURE — 63600175 PHARM REV CODE 636 W HCPCS: Performed by: EMERGENCY MEDICINE

## 2018-10-02 PROCEDURE — 96374 THER/PROPH/DIAG INJ IV PUSH: CPT

## 2018-10-02 PROCEDURE — 84484 ASSAY OF TROPONIN QUANT: CPT

## 2018-10-02 PROCEDURE — 25000003 PHARM REV CODE 250: Performed by: EMERGENCY MEDICINE

## 2018-10-02 PROCEDURE — 25000003 PHARM REV CODE 250: Performed by: INTERNAL MEDICINE

## 2018-10-02 PROCEDURE — 99220 PR INITIAL OBSERVATION CARE,LEVL III: CPT | Mod: ,,, | Performed by: PHYSICIAN ASSISTANT

## 2018-10-02 PROCEDURE — G0378 HOSPITAL OBSERVATION PER HR: HCPCS

## 2018-10-02 PROCEDURE — 63600175 PHARM REV CODE 636 W HCPCS: Performed by: PHYSICIAN ASSISTANT

## 2018-10-02 PROCEDURE — 93005 ELECTROCARDIOGRAM TRACING: CPT

## 2018-10-02 PROCEDURE — 85025 COMPLETE CBC W/AUTO DIFF WBC: CPT

## 2018-10-02 PROCEDURE — 94761 N-INVAS EAR/PLS OXIMETRY MLT: CPT

## 2018-10-02 PROCEDURE — 25000003 PHARM REV CODE 250: Performed by: PHYSICIAN ASSISTANT

## 2018-10-02 PROCEDURE — 94799 UNLISTED PULMONARY SVC/PX: CPT

## 2018-10-02 PROCEDURE — 25500020 PHARM REV CODE 255: Performed by: EMERGENCY MEDICINE

## 2018-10-02 PROCEDURE — 80048 BASIC METABOLIC PNL TOTAL CA: CPT

## 2018-10-02 PROCEDURE — 99285 EMERGENCY DEPT VISIT HI MDM: CPT | Mod: 25

## 2018-10-02 RX ORDER — MORPHINE SULFATE 4 MG/ML
8 INJECTION, SOLUTION INTRAMUSCULAR; INTRAVENOUS
Status: COMPLETED | OUTPATIENT
Start: 2018-10-02 | End: 2018-10-02

## 2018-10-02 RX ORDER — HYDROCODONE BITARTRATE AND ACETAMINOPHEN 5; 325 MG/1; MG/1
1 TABLET ORAL EVERY 8 HOURS PRN
Status: DISCONTINUED | OUTPATIENT
Start: 2018-10-02 | End: 2018-10-02

## 2018-10-02 RX ORDER — SODIUM CHLORIDE 0.9 % (FLUSH) 0.9 %
5 SYRINGE (ML) INJECTION
Status: DISCONTINUED | OUTPATIENT
Start: 2018-10-02 | End: 2018-10-03 | Stop reason: HOSPADM

## 2018-10-02 RX ORDER — ONDANSETRON 8 MG/1
8 TABLET, ORALLY DISINTEGRATING ORAL EVERY 8 HOURS PRN
Status: DISCONTINUED | OUTPATIENT
Start: 2018-10-02 | End: 2018-10-03 | Stop reason: HOSPADM

## 2018-10-02 RX ORDER — KETOROLAC TROMETHAMINE 30 MG/ML
15 INJECTION, SOLUTION INTRAMUSCULAR; INTRAVENOUS EVERY 8 HOURS PRN
Status: DISCONTINUED | OUTPATIENT
Start: 2018-10-02 | End: 2018-10-03 | Stop reason: HOSPADM

## 2018-10-02 RX ORDER — KETOROLAC TROMETHAMINE 30 MG/ML
15 INJECTION, SOLUTION INTRAMUSCULAR; INTRAVENOUS EVERY 6 HOURS PRN
Status: DISCONTINUED | OUTPATIENT
Start: 2018-10-02 | End: 2018-10-02

## 2018-10-02 RX ORDER — HYDROCODONE BITARTRATE AND ACETAMINOPHEN 5; 325 MG/1; MG/1
1 TABLET ORAL EVERY 8 HOURS PRN
Status: DISCONTINUED | OUTPATIENT
Start: 2018-10-02 | End: 2018-10-03 | Stop reason: HOSPADM

## 2018-10-02 RX ADMIN — HYDROCODONE BITARTRATE AND ACETAMINOPHEN 1 TABLET: 5; 325 TABLET ORAL at 08:10

## 2018-10-02 RX ADMIN — KETOROLAC TROMETHAMINE 15 MG: 30 INJECTION, SOLUTION INTRAMUSCULAR at 01:10

## 2018-10-02 RX ADMIN — IOHEXOL 75 ML: 350 INJECTION, SOLUTION INTRAVENOUS at 04:10

## 2018-10-02 RX ADMIN — RIVAROXABAN 15 MG: 15 TABLET, FILM COATED ORAL at 06:10

## 2018-10-02 RX ADMIN — MORPHINE SULFATE 8 MG: 4 INJECTION, SOLUTION INTRAMUSCULAR; INTRAVENOUS at 04:10

## 2018-10-02 RX ADMIN — RIVAROXABAN 15 MG: 15 TABLET, FILM COATED ORAL at 05:10

## 2018-10-02 NOTE — ED TRIAGE NOTES
Pt to ED via EMS with CO chest pain that worsened tonight after waking up. The pain is mid sternal and radiates to bilateral shoulders. Pt recently diagnosed with bilateral PEs, and discharged home from this facility yesterday. PT reports he has been compliant with his Xarelto, and that norco had controlled his pain until tonight. PT is tachypnic, but states he is unsure if it is secondary to his pain.

## 2018-10-02 NOTE — TELEPHONE ENCOUNTER
Called pt mother to get in touch with her son and she informed me that her son was in the hospital and I informed her that what's I wanted to confirm and his appt for tomorrow have been cancel and please call when he can to reschedule his appt. Pt mother showed verbal understanding

## 2018-10-02 NOTE — PLAN OF CARE
No PCP and uses [facility] on [address].  Pt lives with 2 roommates and mother will provide transportation home.  No needs identified.       10/02/18 1535   Discharge Assessment   Assessment Type Discharge Planning Assessment   Assessment information obtained from? Patient   Communicated expected length of stay with patient/caregiver no   Prior to hospitilization cognitive status: Alert/Oriented   Prior to hospitalization functional status: Independent   Current cognitive status: Alert/Oriented   Current Functional Status: Independent   Able to Return to Prior Arrangements yes   Is patient able to care for self after discharge? Unable to determine at this time (comments)   Patient's perception of discharge disposition home or selfcare   Readmission Within The Last 30 Days previous discharge plan unsuccessful   Patient currently being followed by outpatient case management? No   Patient currently receives any other outside agency services? No   Equipment Currently Used at Home none   Do you have any problems affording any of your prescribed medications? No   Is the patient taking medications as prescribed? yes   Does the patient have transportation home? Yes   Transportation Available family or friend will provide   Does the patient receive services at the Coumadin Clinic? No   Patient/Family In Agreement With Plan yes   Readmission Questionnaire   At the time of your discharge, did someone talk to you about what your health problems were? Yes   At the time of discharge, did someone talk to you about what to watch out for regarding worsening of your health problem? Yes   At the time of discharge, did someone talk to you about what to do if you experienced worsening of your health problem? Yes   At the time of discharge, did someone talk to you about which medication to take when you left the hospital and which ones to stop taking? Yes   At the time of discharge, did someone talk to you about when and  where to follow up with a doctor after you left the hospital? Yes   How often do you need to have someone help you when you read instructions, pamphlets, or other written material from your doctor or pharmacy? Never   Do you have problems taking your medications as prescribed? No   Do you have any problems affording any of  your prescribed medications? No   Do you have problems obtaining/receiving your medications? No   Does the patient have transportation to healthcare appointments? Yes   Does the patient have family/friends to help with healtcare needs after discharge? yes   Does your caregiver provide all the help you need? Yes   Are you currently feeling confused? No   Are you currently having problems thinking? No   Are you currently having memory problems? No   Have you felt down, depressed, or hopeless? 0   Have you felt little interest or pleasure in doing things? 0   In the last 7 days, my sleep quality was: fair

## 2018-10-02 NOTE — H&P
[facility] Hospital Medicine  History & Physical    Patient Name: Alcides Schaefer  MRN: [id number]  Admission Date: 10/2/2018  Attending Physician: Smita Colorado MD   Primary Care Provider: Primary Doctor No         Patient information was obtained from patient, past medical records and ER records.     Subjective:     Principal Problem:Right-sided chest pain    Chief Complaint:   Chief Complaint   Patient presents with    Chest Pain     recently dx with bilateral PE        HPI: 22 y/o male with recent admission for pulmonary embolism discharged on Xarelto (9/29/2018) presented to ED with c/o chest pain, shoulder pain that woke him up from sleep. Reports associated SOB possibly due to pain. States he has been compliant with Xarelto, not missing any doses and also with hydrocodone but only taking twice a day. He denies any fever, chills, cough, N/V, lightheadedness. Repeat CT in ED 10/1/2018 No evidence of new large central pulmonary embolus or CT evidence to suggest right-sided heart strain.     Admitted to Observation           Past Medical History:   Diagnosis Date    PE (pulmonary thromboembolism)        Past Surgical History:   Procedure Laterality Date    NO PAST SURGERIES         Review of patient's allergies indicates:  No Known Allergies    No current facility-administered medications on file prior to encounter.      Current Outpatient Medications on File Prior to Encounter   Medication Sig    acetaminophen (TYLENOL) 325 mg Cap Take 2 tablets by mouth 2 (two) times daily as needed.     HYDROcodone-acetaminophen (NORCO) 5-325 mg per tablet Take 1 tablet by mouth every 8 (eight) hours as needed for Pain (pleuritic pain).    rivaroxaban (XARELTO) 15 mg Tab Take 1 tablet (15 mg total) by mouth 2 (two) times daily with meals. Please take first dose between 6:00 pm and 8:00 pm on Saturday, September 29, 2018. for 21 days    multivitamin (THERAGRAN) per tablet Take 1 tablet by mouth  once daily.     Family History     Problem Relation (Age of Onset)    Pulmonary embolism Sister, Paternal Aunt        Tobacco Use    Smoking status: Current Some Day Smoker     Types: Cigarettes    Smokeless tobacco: Never Used    Tobacco comment: 2-3 cigarettes daily    Substance and Sexual Activity    Alcohol use: Yes    Drug use: Yes     Frequency: 3.0 times per week     Types: Marijuana    Sexual activity: Not on file     Review of Systems   Constitutional: Negative for activity change, appetite change, chills, diaphoresis, fatigue and fever.   HENT: Negative for congestion and sore throat.    Eyes: Negative.    Respiratory: Negative for cough, shortness of breath and wheezing.    Cardiovascular: Positive for chest pain. Negative for palpitations and leg swelling.   Gastrointestinal: Negative for abdominal distention, abdominal pain, nausea and vomiting.   Endocrine: Negative.    Genitourinary: Negative for difficulty urinating and flank pain.   Musculoskeletal: Negative for back pain and neck pain.   Neurological: Negative for dizziness, syncope, light-headedness and headaches.   Psychiatric/Behavioral: Negative for agitation.     Objective:     Vital Signs (Most Recent):  Temp: 96.5 °F (35.8 °C) (10/02/18 1116)  Pulse: (!) 50 (10/02/18 1116)  Resp: 16 (10/02/18 0748)  BP: (!) 108/56 (10/02/18 1116)  SpO2: 98 % (10/02/18 1116) Vital Signs (24h Range):  Temp:  [96.5 °F (35.8 °C)-98.8 °F (37.1 °C)] 96.5 °F (35.8 °C)  Pulse:  [50-68] 50  Resp:  [16-26] 16  SpO2:  [97 %-100 %] 98 %  BP: (108-130)/(56-66) 108/56     Weight: 61.2 kg (135 lb)  Body mass index is 19.37 kg/m².    Physical Exam   Constitutional: He is oriented to person, place, and time. He appears well-developed and well-nourished. No distress.   HENT:   Head: Normocephalic and atraumatic.   Mouth/Throat: Oropharynx is clear and moist. No oropharyngeal exudate.   Eyes: Conjunctivae are normal. Pupils are equal, round, and reactive to light. No  scleral icterus.   Neck: Normal range of motion. Neck supple.   Cardiovascular: Normal rate, regular rhythm, normal heart sounds and intact distal pulses.   No murmur heard.  Pulmonary/Chest: Effort normal and breath sounds normal. No stridor. No respiratory distress.   Abdominal: Soft. Bowel sounds are normal. He exhibits no distension. There is no tenderness.   Musculoskeletal: Normal range of motion. He exhibits no edema, tenderness or deformity.   Neurological: He is alert and oriented to person, place, and time. No cranial nerve deficit.   Skin: Skin is warm and dry. He is not diaphoretic.   Psychiatric: He has a normal mood and affect.   Nursing note and vitals reviewed.        CRANIAL NERVES     CN III, IV, VI   Pupils are equal, round, and reactive to light.       Significant Labs:   CBC:   Recent Labs   Lab  10/01/18   1632  10/02/18   0446   WBC  9.78  9.01   HGB  14.5  13.7*   HCT  41.7  39.6*   PLT  207  197     CMP:   Recent Labs   Lab  10/01/18   1632  10/02/18   0446   NA  142  142   K  4.0  3.9   CL  104  103   CO2  28  26   GLU  87  102   BUN  10  11   CREATININE  0.9  0.8   CALCIUM  10.4  10.1   PROT  8.1   --    ALBUMIN  4.3   --    BILITOT  0.6   --    ALKPHOS  77   --    AST  18   --    ALT  10   --    ANIONGAP  10  13   EGFRNONAA  >60.0  >60     All pertinent labs within the past 24 hours have been reviewed.    Significant Imaging: I have reviewed all pertinent imaging results/findings within the past 24 hours.   Imaging Results          CTA Chest Non-Coronary (PE Study) (Final result)  Result time 10/02/18 05:15:21    Final result by Lalit Martinez MD (10/02/18 05:15:21)                 Impression:      1.  Redemonstration of patient's bilateral lower lobe and left upper lobe segmental and subsegmental pulmonary thromboemboli which appear interval less conspicuous with partial recanalization.  No evidence of new large central pulmonary embolus or CT evidence to suggest right-sided heart  strain.    2.  New patchy ground-glass and more consolidative opacities at the left lung base suggestive of evolving pulmonary infarcts.  Stable appearing patchy opacities at the right lung base also suggestive of evolving pulmonary infarct.      Electronically signed by: Lalit Martinez MD  Date:    10/02/2018  Time:    05:15             Narrative:    EXAMINATION:  CTA CHEST NON CORONARY    CLINICAL HISTORY:  known PE with worsening pain;    TECHNIQUE:  Low dose axial images, sagittal and coronal reformations were obtained from the thoracic inlet to the lung bases following the IV administration of 75 mL of Omnipaque 350.  Contrast timing was optimized to evaluate the pulmonary arteries.  MIP images were performed.    COMPARISON:  CTA chest 09/28/2018    FINDINGS:  The visualized soft tissue structures at the base of the neck are unremarkable.    The thoracic aorta maintains normal caliber, contour, and course without significant atherosclerotic calcification within its course.  There is no evidence of aneurysmal dilation or dissection. The heart is not enlarged and there is no evidence of pericardial effusion.The esophagus maintains a normal course and caliber.There is no axillary, mediastinal, or hilar lymph node enlargement.    The trachea is midline and proximal airways are patent. The lungs are symmetrically expanded. There is persistent peripheral patchy ground-glass and more consolidative opacity at the right lung base, similar to prior examination.  There are additional new patchy peripheral ground-glass and slightly more consolidative opacities at the left lung base.  Findings are suggestive of evolving pulmonary infarcts in this patient with known history of pulmonary thromboembolism.  There is no pleural effusion or pneumothorax.    There are thin linear filling defects noted within left upper and lower segmental and subsegmental arteries as well as right lower lobe segmental arteries consistent with  patient's known history of pulmonary thromboembolism.  These appear less conspicuous compared to prior examination.  No evidence of new large central pulmonary embolus.  No CT evidence to suggest right-sided heart strain..    Limited images of the upper abdomen obtained during the course of this dedicated thoracic CT demonstrate nothing unusual.    The osseous structures are unremarkable.                                  Assessment/Plan:     * Right-sided chest pain    - pleuritic chest pain  - Toradol, hydrocodone          Bilateral pulmonary embolism    - hypercoagulable genetic studies pending  - anticoagulated on Xarelto, will continue  - IS  - no new PE on CT, doppler LE negative for DVT  - 2D ECHO on 9/28 was negative for any intra cardiac shunt                  VTE Risk Mitigation (From admission, onward)        Ordered     rivaroxaban tablet 15 mg  2 times daily with meals      10/02/18 1240             Esthela Dunne PA-C  Department of Hospital Medicine   [facility]

## 2018-10-02 NOTE — ED NOTES
Assumed care of pt from SAEED Khalil. Pt resting in stretcher with HOB @ 90 degrees. Bed in lowest, locked position, side rails up x 2. Call light within reach. Pt reporting current pain 6/10 to right chest wall but much improved from arrival to ED.

## 2018-10-02 NOTE — PHARMACY MED REC
"Admission Medication Reconciliation - Pharmacy Consult Note    The home medication history was taken by Anitha Terry CPhT.  Based on information gathered and subsequent review by the clinical pharmacist, the items below may need attention.    You may go to "Admission" then "Reconcile Home Medications" tabs to review and/or act upon these items.    No issues noted with the medication reconciliation.    Medications Prior to Admission   Medication Sig Dispense Refill Last Dose    acetaminophen (TYLENOL) 325 mg Cap Take 2 tablets by mouth 2 (two) times daily as needed.    Taking    HYDROcodone-acetaminophen (NORCO) 5-325 mg per tablet Take 1 tablet by mouth every 8 (eight) hours as needed for Pain (pleuritic pain). 12 tablet 0 Taking    rivaroxaban (XARELTO) 15 mg Tab Take 1 tablet (15 mg total) by mouth 2 (two) times daily with meals. Please take first dose between 6:00 pm and 8:00 pm on Saturday, September 29, 2018. for 21 days 42 tablet 0 Taking     Please address this information as you see fit.  Feel free to contact us if you have any questions or require assistance.    Emre Birmingham, Pharm.D., BCPS  [phone]                .  .          "

## 2018-10-02 NOTE — ASSESSMENT & PLAN NOTE
- hypercoagulable genetic studies pending  - anticoagulated on Xarelto, will continue  - IS  - no new PE on CT, doppler LE negative for DVT  - 2D ECHO on 9/28 was negative for any intra cardiac shunt

## 2018-10-02 NOTE — ED PROVIDER NOTES
Encounter Date: 10/2/2018    SCRIBE #1 NOTE: IRosana am scribing for, and in the presence of, Dr. Cadet.       History     Chief Complaint   Patient presents with    Chest Pain     recently dx with bilateral PE     Time seen by provider: 4:39 AM    This is a 23 y.o. male who presents with complaint of shoulder and chest pain, waking him up from his sleep this morning. There is associated SOB, which pt states is unclear if secondary to pain or not. He does report having had trouble going to sleep. Pt was diagnosed with bilateral PE on 9/28. He has been on Xarelto since and denies any missed dosages. Pt has been managing pain easily since discharge with hydrocodone and tylenol until today; last dose 7 hours ago. Pt denies any fever, chills, N/V, palpitations, cough, dizziness, lightheadedness, weakness, and confusion.      The history is provided by the patient.     Review of patient's allergies indicates:  No Known Allergies  Past Medical History:   Diagnosis Date    PE (pulmonary thromboembolism)      Past Surgical History:   Procedure Laterality Date    NO PAST SURGERIES       Family History   Problem Relation Age of Onset    Pulmonary embolism Sister     Pulmonary embolism Paternal Aunt      Social History     Tobacco Use    Smoking status: Current Some Day Smoker     Types: Cigarettes    Smokeless tobacco: Never Used    Tobacco comment: 2-3 cigarettes daily    Substance Use Topics    Alcohol use: Yes    Drug use: Yes     Frequency: 3.0 times per week     Types: Marijuana     Review of Systems   Constitutional: Negative for chills and fever.   HENT: Negative for congestion, rhinorrhea and sore throat.    Respiratory: Positive for shortness of breath. Negative for cough.    Cardiovascular: Positive for chest pain. Negative for palpitations and leg swelling.   Gastrointestinal: Negative for abdominal pain, diarrhea, nausea and vomiting.   Endocrine: Negative for polyuria.   Genitourinary:  Negative for decreased urine volume and dysuria.   Musculoskeletal: Negative for back pain and neck pain.        Bilateral shoulder pain.   Skin: Negative for rash.   Allergic/Immunologic: Negative for immunocompromised state.   Neurological: Negative for dizziness, weakness, light-headedness and numbness.   Hematological: Does not bruise/bleed easily.   Psychiatric/Behavioral: Negative for confusion.       Physical Exam     Initial Vitals [10/02/18 0438]   BP Pulse Resp Temp SpO2   119/66 63 (!) 26 97.8 °F (36.6 °C) 100 %      MAP       --         Physical Exam    Nursing note and vitals reviewed.  Constitutional: He appears well-developed and well-nourished. He is not diaphoretic. No distress.   HENT:   Head: Normocephalic and atraumatic.   Right Ear: External ear normal.   Left Ear: External ear normal.   Eyes: Right eye exhibits no discharge. Left eye exhibits no discharge.   Neck: Normal range of motion. Neck supple.   Cardiovascular: Normal rate, regular rhythm, normal heart sounds and intact distal pulses. Exam reveals no gallop and no friction rub.    No murmur heard.  Pulmonary/Chest: Breath sounds normal. No respiratory distress. He has no wheezes. He has no rhonchi. He has no rales. He exhibits no tenderness.   Abdominal: Soft. Bowel sounds are normal. He exhibits no distension. There is no tenderness. There is no rebound and no guarding.   Musculoskeletal: Normal range of motion. He exhibits no edema or tenderness.   Lymphadenopathy:     He has no cervical adenopathy.   Neurological: He is alert and oriented to person, place, and time. He has normal strength. No sensory deficit.   Skin: Skin is warm and dry. No rash noted. No erythema.   Psychiatric: He has a normal mood and affect. His behavior is normal. Judgment and thought content normal.         ED Course   Procedures  Labs Reviewed   CBC W/ AUTO DIFFERENTIAL - Abnormal; Notable for the following components:       Result Value    RBC 4.39 (*)      Hemoglobin 13.7 (*)     Hematocrit 39.6 (*)     MCH 31.2 (*)     Mono # 1.1 (*)     All other components within normal limits   BASIC METABOLIC PANEL   TROPONIN I     EKG Readings: (Independently Interpreted)   NSR at a rate of 62. No STEMI. Unchanged from EKG performed on 9/28/18.       Imaging Results          CTA Chest Non-Coronary (PE Study) (In process)                  Medical Decision Making:   Independently Interpreted Test(s):   I have ordered and independently interpreted EKG Reading(s) - see prior notes  Clinical Tests:   Lab Tests: Ordered and Reviewed  Radiological Study: Ordered  Medical Tests: Ordered and Reviewed  ED Management:  Well-appearing patient recently diagnosed with bilateral pulmonary embolisms.  I have reviewed his chart here.  Discharged with pain medicine and Xarelto 2 days ago.  Patient reports he was doing well until tonight when he woke with crushing chest pain and difficulty breathing.  No hypoxia here.  Hemodynamically stable.  Blood work without concerning abnormalities.  Patient does report compliance with his medicines.  I have obtained a no other CTA of his chest which demonstrates worsening pulmonary infarcts on the left.  Will hospitalize with the hospital medicine service for further therapeutics.    EARNEST Cadet M.D.  10/02/2018  6:15 AM              Scribe Attestation:   Scribe #1: I performed the above scribed service and the documentation accurately describes the services I performed. I attest to the accuracy of the note.    Attending Attestation:           Physician Attestation for Scribe:  Physician Attestation Statement for Scribe #1: I, Dr. Cadet, reviewed documentation, as scribed by Rosana Colon in my presence, and it is both accurate and complete.                    Clinical Impression:     1. Bilateral pulmonary embolism    2. Chest pain    3. Pulmonary infarction                                 Norman Cadet MD  10/02/18 0615

## 2018-10-02 NOTE — HPI
24 y/o male with recent admission for pulmonary embolism discharged on Xarelto (9/29/2018) presented to ED with c/o chest pain, shoulder pain that woke him up from sleep. Reports associated SOB possibly due to pain. States he has been compliant with Xarelto, not missing any doses and also with hydrocodone but only taking twice a day. He denies any fever, chills, cough, N/V, lightheadedness. Repeat CT in ED 10/1/2018 No evidence of new large central pulmonary embolus or CT evidence to suggest right-sided heart strain.     Admitted to Observation

## 2018-10-02 NOTE — SUBJECTIVE & OBJECTIVE
Past Medical History:   Diagnosis Date    PE (pulmonary thromboembolism)        Past Surgical History:   Procedure Laterality Date    NO PAST SURGERIES         Review of patient's allergies indicates:  No Known Allergies    No current facility-administered medications on file prior to encounter.      Current Outpatient Medications on File Prior to Encounter   Medication Sig    acetaminophen (TYLENOL) 325 mg Cap Take 2 tablets by mouth 2 (two) times daily as needed.     HYDROcodone-acetaminophen (NORCO) 5-325 mg per tablet Take 1 tablet by mouth every 8 (eight) hours as needed for Pain (pleuritic pain).    rivaroxaban (XARELTO) 15 mg Tab Take 1 tablet (15 mg total) by mouth 2 (two) times daily with meals. Please take first dose between 6:00 pm and 8:00 pm on Saturday, September 29, 2018. for 21 days    multivitamin (THERAGRAN) per tablet Take 1 tablet by mouth once daily.     Family History     Problem Relation (Age of Onset)    Pulmonary embolism Sister, Paternal Aunt        Tobacco Use    Smoking status: Current Some Day Smoker     Types: Cigarettes    Smokeless tobacco: Never Used    Tobacco comment: 2-3 cigarettes daily    Substance and Sexual Activity    Alcohol use: Yes    Drug use: Yes     Frequency: 3.0 times per week     Types: Marijuana    Sexual activity: Not on file     Review of Systems   Constitutional: Negative for activity change, appetite change, chills, diaphoresis, fatigue and fever.   HENT: Negative for congestion and sore throat.    Eyes: Negative.    Respiratory: Negative for cough, shortness of breath and wheezing.    Cardiovascular: Positive for chest pain. Negative for palpitations and leg swelling.   Gastrointestinal: Negative for abdominal distention, abdominal pain, nausea and vomiting.   Endocrine: Negative.    Genitourinary: Negative for difficulty urinating and flank pain.   Musculoskeletal: Negative for back pain and neck pain.   Neurological: Negative for dizziness,  syncope, light-headedness and headaches.   Psychiatric/Behavioral: Negative for agitation.     Objective:     Vital Signs (Most Recent):  Temp: 96.5 °F (35.8 °C) (10/02/18 1116)  Pulse: (!) 50 (10/02/18 1116)  Resp: 16 (10/02/18 0748)  BP: (!) 108/56 (10/02/18 1116)  SpO2: 98 % (10/02/18 1116) Vital Signs (24h Range):  Temp:  [96.5 °F (35.8 °C)-98.8 °F (37.1 °C)] 96.5 °F (35.8 °C)  Pulse:  [50-68] 50  Resp:  [16-26] 16  SpO2:  [97 %-100 %] 98 %  BP: (108-130)/(56-66) 108/56     Weight: 61.2 kg (135 lb)  Body mass index is 19.37 kg/m².    Physical Exam   Constitutional: He is oriented to person, place, and time. He appears well-developed and well-nourished. No distress.   HENT:   Head: Normocephalic and atraumatic.   Mouth/Throat: Oropharynx is clear and moist. No oropharyngeal exudate.   Eyes: Conjunctivae are normal. Pupils are equal, round, and reactive to light. No scleral icterus.   Neck: Normal range of motion. Neck supple.   Cardiovascular: Normal rate, regular rhythm, normal heart sounds and intact distal pulses.   No murmur heard.  Pulmonary/Chest: Effort normal and breath sounds normal. No stridor. No respiratory distress.   Abdominal: Soft. Bowel sounds are normal. He exhibits no distension. There is no tenderness.   Musculoskeletal: Normal range of motion. He exhibits no edema, tenderness or deformity.   Neurological: He is alert and oriented to person, place, and time. No cranial nerve deficit.   Skin: Skin is warm and dry. He is not diaphoretic.   Psychiatric: He has a normal mood and affect.   Nursing note and vitals reviewed.        CRANIAL NERVES     CN III, IV, VI   Pupils are equal, round, and reactive to light.       Significant Labs:   CBC:   Recent Labs   Lab  10/01/18   1632  10/02/18   0446   WBC  9.78  9.01   HGB  14.5  13.7*   HCT  41.7  39.6*   PLT  207  197     CMP:   Recent Labs   Lab  10/01/18   1632  10/02/18   0446   NA  142  142   K  4.0  3.9   CL  104  103   CO2  28  26   GLU  87   102   BUN  10  11   CREATININE  0.9  0.8   CALCIUM  10.4  10.1   PROT  8.1   --    ALBUMIN  4.3   --    BILITOT  0.6   --    ALKPHOS  77   --    AST  18   --    ALT  10   --    ANIONGAP  10  13   EGFRNONAA  >60.0  >60     All pertinent labs within the past 24 hours have been reviewed.    Significant Imaging: I have reviewed all pertinent imaging results/findings within the past 24 hours.   Imaging Results          CTA Chest Non-Coronary (PE Study) (Final result)  Result time 10/02/18 05:15:21    Final result by Lalit Martinez MD (10/02/18 05:15:21)                 Impression:      1.  Redemonstration of patient's bilateral lower lobe and left upper lobe segmental and subsegmental pulmonary thromboemboli which appear interval less conspicuous with partial recanalization.  No evidence of new large central pulmonary embolus or CT evidence to suggest right-sided heart strain.    2.  New patchy ground-glass and more consolidative opacities at the left lung base suggestive of evolving pulmonary infarcts.  Stable appearing patchy opacities at the right lung base also suggestive of evolving pulmonary infarct.      Electronically signed by: Lalit Martinez MD  Date:    10/02/2018  Time:    05:15             Narrative:    EXAMINATION:  CTA CHEST NON CORONARY    CLINICAL HISTORY:  known PE with worsening pain;    TECHNIQUE:  Low dose axial images, sagittal and coronal reformations were obtained from the thoracic inlet to the lung bases following the IV administration of 75 mL of Omnipaque 350.  Contrast timing was optimized to evaluate the pulmonary arteries.  MIP images were performed.    COMPARISON:  CTA chest 09/28/2018    FINDINGS:  The visualized soft tissue structures at the base of the neck are unremarkable.    The thoracic aorta maintains normal caliber, contour, and course without significant atherosclerotic calcification within its course.  There is no evidence of aneurysmal dilation or dissection. The heart is  not enlarged and there is no evidence of pericardial effusion.The esophagus maintains a normal course and caliber.There is no axillary, mediastinal, or hilar lymph node enlargement.    The trachea is midline and proximal airways are patent. The lungs are symmetrically expanded. There is persistent peripheral patchy ground-glass and more consolidative opacity at the right lung base, similar to prior examination.  There are additional new patchy peripheral ground-glass and slightly more consolidative opacities at the left lung base.  Findings are suggestive of evolving pulmonary infarcts in this patient with known history of pulmonary thromboembolism.  There is no pleural effusion or pneumothorax.    There are thin linear filling defects noted within left upper and lower segmental and subsegmental arteries as well as right lower lobe segmental arteries consistent with patient's known history of pulmonary thromboembolism.  These appear less conspicuous compared to prior examination.  No evidence of new large central pulmonary embolus.  No CT evidence to suggest right-sided heart strain..    Limited images of the upper abdomen obtained during the course of this dedicated thoracic CT demonstrate nothing unusual.    The osseous structures are unremarkable.

## 2018-10-02 NOTE — ED NOTES
Pt bed adjusted to a position of comfort, pt given call bell and instructed to call for any needs. Bed locked and low, rails up Xs 2, will continue to monitor.

## 2018-10-03 VITALS
HEIGHT: 70 IN | WEIGHT: 135 LBS | RESPIRATION RATE: 16 BRPM | HEART RATE: 70 BPM | OXYGEN SATURATION: 98 % | SYSTOLIC BLOOD PRESSURE: 109 MMHG | TEMPERATURE: 98 F | BODY MASS INDEX: 19.33 KG/M2 | DIASTOLIC BLOOD PRESSURE: 57 MMHG

## 2018-10-03 LAB
F2 GENE MUT ANL BLD/T: NORMAL
F5 P.R506Q BLD/T QL: NORMAL
RBC CD59 DEFICIENT NFR: 0 % (ref 0–0)

## 2018-10-03 PROCEDURE — 25000003 PHARM REV CODE 250: Performed by: PHYSICIAN ASSISTANT

## 2018-10-03 PROCEDURE — 99217 PR OBSERVATION CARE DISCHARGE: CPT | Mod: ,,, | Performed by: PHYSICIAN ASSISTANT

## 2018-10-03 PROCEDURE — 25000003 PHARM REV CODE 250: Performed by: INTERNAL MEDICINE

## 2018-10-03 PROCEDURE — G0378 HOSPITAL OBSERVATION PER HR: HCPCS

## 2018-10-03 RX ADMIN — HYDROCODONE BITARTRATE AND ACETAMINOPHEN 1 TABLET: 5; 325 TABLET ORAL at 05:10

## 2018-10-03 RX ADMIN — RIVAROXABAN 15 MG: 15 TABLET, FILM COATED ORAL at 07:10

## 2018-10-03 NOTE — ASSESSMENT & PLAN NOTE
- hypercoagulable genetic studies pending  - anticoagulated on Xarelto, continue  - cont IS  - no new PE on CT, doppler LE negative for DVT  - 2D ECHO on 9/28 was negative for any intra cardiac shunt

## 2018-10-03 NOTE — PLAN OF CARE
Problem: Patient Care Overview  Goal: Plan of Care Review  Outcome: Ongoing (interventions implemented as appropriate)  Patient in no apparent distress. Sat's  100 % on room air. IS done . Will continue to monitor.

## 2018-10-03 NOTE — DISCHARGE INSTRUCTIONS
Follow up with PCP as needed.   Activity as tolerated.   Regular Diet.   Return to ER with any worsening symptoms.             Thank you for choosing [facility] as your Health Care Provider. [facility] strives to provide the best healthcare available to you. In the next few days you may receive a Survey, either by mail or email,  asking you to rate our care that was provided to you during your stay.  Please return the survey to us, as your feedback is important. We aim to meet your expectations of safe, quality health care.    From your [facility] Care Team.      Discharge Instructions for Pulmonary Embolism  A deep vein thrombosis (DVT) is a blood clot in a large vein deep in a leg, arm, or elsewhere in the body. The clot can separate from the vein, travel to the lungs and cut off blood flow. This is a pulmonary embolism (PE). Pulmonary embolism is very serious and may cause death.   Healthcare providers use the term venous thromboembolism (VTE) to describe both DVT and PE. They use the term VTE because the two conditions are very closely related. And, because their prevention and treatment are closely related.   Home care  Taking care of yourself is very important. To help prevent more blood clots from forming, follow your healthcare provider's instructions. Do the following:  · Take your medicines exactly as instructed. Dont skip doses. If you miss a dose, call your healthcare provider and ask what you should do.    · Have all lab tests as recommended. This is very important when you take medicines to prevent blood clots.   · If your healthcare provider has instructed you to do so, wear elastic (compression stockings).  · Get up and get moving.  · While sitting for long periods of time, move your knees, ankles, feet, and toes.  Lifestyle changes  To help prevent problems with your heart and blood vessels, do the following:   · If you smoke, get help to quit. Talk with your healthcare  provider about medicines and programs that can help.  · Stay at a healthy weight. Talk to your healthcare provider about losing weight, if you are overweight  · Try to exercise at least 30 minutes on most days. Before starting an exercise program, talk with your healthcare provider.   · When traveling by car, make frequent stops to get up and move around.  · On long airplane rides, get up and move around when possible. If you cant get up, wiggle your toes, move your ankles and tighten your calves to keep your blood moving.  Follow-up care  Make a follow-up appointment as directed  Have your lab work done as directed.     When to seek medical advice  Call your healthcare provider if you have pain, swelling, and redness in your leg, arm, or other body area. These symptoms may mean another blood clot.  And, call your healthcare provider if you have signs and symptoms of bleeding, like blood in your urine, bleeding with bowel movements, or bleeding from the nose, gums, a cut, or vagina.   Call 911  Call 911 or get emergency help if you have symptoms of a blood clot in the lungs including:   · Chest pain  · Trouble breathing  · Coughing (may cough up blood)  · Fast heartbeat  · Sweating  · Fainting  Also call 911 if you have:  · Heavy or uncontrolled bleeding. If you are taking a blood thinner, you have an increased chance of bleeding.   Date Last Reviewed: 2/1/2017  © 3067-7649 [employer]. [address] [place] [address], [place] [address]. All rights reserved. This information is not intended as a substitute for professional medical care. Always follow your healthcare professional's instructions.

## 2018-10-03 NOTE — PLAN OF CARE
Problem: Patient Care Overview  Goal: Individualization & Mutuality  Outcome: Ongoing (interventions implemented as appropriate)  Pt free of falls/trauma/injuries this shift.VSS. Medication administered as perscribed. PRN norco  administered for pain control. Patient tolerating POC well. Pt verbalizes understanding of care. Pt denies any chest pain, SOB, or any other discomforts at this time. Will continue to monitor.

## 2018-10-03 NOTE — DISCHARGE SUMMARY
[facility] Hospital Medicine  Discharge Summary      Patient Name: Alcides Schaefer  MRN: [id number]  Admission Date: 10/2/2018  Hospital Length of Stay: 0 days  Discharge Date and Time:  10/03/2018 10:31 AM  Attending Physician: Smita Colorado MD   Discharging Provider: Esthela Dunne PA-C  Primary Care Provider: Primary Doctor No      HPI:   22 y/o male with recent admission for pulmonary embolism discharged on Xarelto (9/29/2018) presented to ED with c/o chest pain, shoulder pain that woke him up from sleep. Reports associated SOB possibly due to pain. States he has been compliant with Xarelto, not missing any doses and also with hydrocodone but only taking twice a day. He denies any fever, chills, cough, N/V, lightheadedness. Repeat CT in ED 10/1/2018 No evidence of new large central pulmonary embolus or CT evidence to suggest right-sided heart strain.     Admitted to Observation           * No surgery found *      Hospital Course:   22 y/o male with recent b/l PE admitted with right sided chest pain. Patient anticoagulated on Xarelto- no new PE on CT, doppler LE negative for DVT, 2D ECHO on 9/28 was negative for any intra cardiac shunt. Clinically improved, ambulating without any symptoms, on RA during entire course. Clinically improved, vitals stable, discharged home. Patient to follow with Heme/Onco and will be moving back to [place] in a couple of weeks.      Consults:     * Right-sided chest pain    - pleuritic chest pain, improved             Bilateral pulmonary embolism    - hypercoagulable genetic studies pending  - anticoagulated on Xarelto, continue  - cont IS  - no new PE on CT, doppler LE negative for DVT  - 2D ECHO on 9/28 was negative for any intra cardiac shunt                  Final Active Diagnoses:    Diagnosis Date Noted POA    PRINCIPAL PROBLEM:  Right-sided chest pain [R07.9] 09/28/2018 Yes    Bilateral pulmonary embolism [I26.99] 10/02/2018 Yes    Pulmonary  infarct [I26.99] 09/28/2018 Yes      Problems Resolved During this Admission:       Discharged Condition: stable    Disposition: Home or Self Care    Follow Up:  Follow-up Information     Felton Sullivan MD.    Specialty:  Hematology and Oncology  Contact information:  [address]  [facility] [id number]  [phone]                 Patient Instructions:      Diet Adult Regular     Notify your health care provider if you experience any of the following:  temperature >100.4     Notify your health care provider if you experience any of the following:  persistent nausea and vomiting or diarrhea     Notify your health care provider if you experience any of the following:  severe uncontrolled pain     Notify your health care provider if you experience any of the following:  difficulty breathing or increased cough     Activity as tolerated       Significant Diagnostic Studies: Labs:   CMP   Recent Labs   Lab  10/01/18   1632  10/02/18   0446   NA  142  142   K  4.0  3.9   CL  104  103   CO2  28  26   GLU  87  102   BUN  10  11   CREATININE  0.9  0.8   CALCIUM  10.4  10.1   PROT  8.1   --    ALBUMIN  4.3   --    BILITOT  0.6   --    ALKPHOS  77   --    AST  18   --    ALT  10   --    ANIONGAP  10  13   ESTGFRAFRICA  >60.0  >60   EGFRNONAA  >60.0  >60   , CBC   Recent Labs   Lab  10/01/18   1632  10/02/18   0446   WBC  9.78  9.01   HGB  14.5  13.7*   HCT  41.7  39.6*   PLT  207  197    and All labs within the past 24 hours have been reviewed  Radiology:   Imaging Results          CTA Chest Non-Coronary (PE Study) (Final result)  Result time 10/02/18 05:15:21    Final result by Lalit Martinez MD (10/02/18 05:15:21)                 Impression:      1.  Redemonstration of patient's bilateral lower lobe and left upper lobe segmental and subsegmental pulmonary thromboemboli which appear interval less conspicuous with partial recanalization.  No evidence of new large central pulmonary embolus or CT evidence to suggest  right-sided heart strain.    2.  New patchy ground-glass and more consolidative opacities at the left lung base suggestive of evolving pulmonary infarcts.  Stable appearing patchy opacities at the right lung base also suggestive of evolving pulmonary infarct.      Electronically signed by: Lalit Martinez MD  Date:    10/02/2018  Time:    05:15             Narrative:    EXAMINATION:  CTA CHEST NON CORONARY    CLINICAL HISTORY:  known PE with worsening pain;    TECHNIQUE:  Low dose axial images, sagittal and coronal reformations were obtained from the thoracic inlet to the lung bases following the IV administration of 75 mL of Omnipaque 350.  Contrast timing was optimized to evaluate the pulmonary arteries.  MIP images were performed.    COMPARISON:  CTA chest 09/28/2018    FINDINGS:  The visualized soft tissue structures at the base of the neck are unremarkable.    The thoracic aorta maintains normal caliber, contour, and course without significant atherosclerotic calcification within its course.  There is no evidence of aneurysmal dilation or dissection. The heart is not enlarged and there is no evidence of pericardial effusion.The esophagus maintains a normal course and caliber.There is no axillary, mediastinal, or hilar lymph node enlargement.    The trachea is midline and proximal airways are patent. The lungs are symmetrically expanded. There is persistent peripheral patchy ground-glass and more consolidative opacity at the right lung base, similar to prior examination.  There are additional new patchy peripheral ground-glass and slightly more consolidative opacities at the left lung base.  Findings are suggestive of evolving pulmonary infarcts in this patient with known history of pulmonary thromboembolism.  There is no pleural effusion or pneumothorax.    There are thin linear filling defects noted within left upper and lower segmental and subsegmental arteries as well as right lower lobe segmental arteries  consistent with patient's known history of pulmonary thromboembolism.  These appear less conspicuous compared to prior examination.  No evidence of new large central pulmonary embolus.  No CT evidence to suggest right-sided heart strain..    Limited images of the upper abdomen obtained during the course of this dedicated thoracic CT demonstrate nothing unusual.    The osseous structures are unremarkable.                                Pending Diagnostic Studies:     None         Medications:   Alcides Schaefer   Home Medication Instructions OREN:[id number]    Printed on:10/03/18 1031   Medication Information                      acetaminophen (TYLENOL) 325 mg Cap  Take 2 tablets by mouth 2 (two) times daily as needed.              HYDROcodone-acetaminophen (NORCO) 5-325 mg per tablet  Take 1 tablet by mouth every 8 (eight) hours as needed for Pain (pleuritic pain).             rivaroxaban (XARELTO) 15 mg Tab  Take 1 tablet (15 mg total) by mouth 2 (two) times daily with meals. Please take first dose between 6:00 pm and 8:00 pm on Saturday, September 29, 2018. for 21 days                 Indwelling Lines/Drains at time of discharge:   Lines/Drains/Airways          None          Time spent on the discharge of patient: >30 minutes  Patient was seen and examined on the date of discharge and determined to be suitable for discharge.         Esthela Dunne PA-C  Department of Hospital Medicine  [facility]

## 2018-10-03 NOTE — NURSING
Discharge instructions reviewed with patient and father. Patient instructed to continue taking Xarelto as prescribed. Peripheral IV removed. Gauze and Coban in place. No questions or concerns voiced.

## 2018-10-03 NOTE — HOSPITAL COURSE
24 y/o male with recent b/l PE admitted with right sided chest pain. Patient anticoagulated on Xarelto- no new PE on CT, doppler LE negative for DVT, 2D ECHO on 9/28 was negative for any intra cardiac shunt. Clinically improved, ambulating without any symptoms, on RA during entire course. Clinically improved, vitals stable, discharged home. Patient to follow with Heme/Onco and will be moving back to [place] in a couple of weeks.

## 2018-10-03 NOTE — PLAN OF CARE
10/03/18 1049   Final Note   Assessment Type Final Discharge Note   Discharge Disposition Home   What phone number can be called within the next 1-3 days to see how you are doing after discharge? [phone]   Discharge plans and expectations educations in teach back method with documentation complete? Yes   Right Care Referral Info   Post Acute Recommendation No Care

## 2018-10-04 LAB
AT III ACT/NOR PPP CHRO: 129 %
B2 GLYCOPROT1 IGA SER QL: <9 SAU
B2 GLYCOPROT1 IGG SER QL: <9 SGU
B2 GLYCOPROT1 IGM SER QL: <9 SMU
FLOW CYTOMETRY SPECIALIST REVIEW: NORMAL
PNH GRANULOCYTES: 0 % (ref 0–0.01)
PNH MONOCYTES: 0 % (ref 0–0.05)
PNH RBC-COMPLETE AG LOSS: 0 % (ref 0–0.01)
PNH RBC-PARTIAL AG LOSS: 0.01 % (ref 0–0.99)

## 2018-10-08 LAB
MPNR  FINAL DIAGNOSIS: NORMAL
MPNR  SPECIMEN TYPE: NORMAL
MPNR RESULT: NORMAL

## 2021-07-01 ENCOUNTER — PATIENT MESSAGE (OUTPATIENT)
Dept: ADMINISTRATIVE | Facility: OTHER | Age: 26
End: 2021-07-01